# Patient Record
Sex: MALE | Race: WHITE | NOT HISPANIC OR LATINO | Employment: FULL TIME | URBAN - METROPOLITAN AREA
[De-identification: names, ages, dates, MRNs, and addresses within clinical notes are randomized per-mention and may not be internally consistent; named-entity substitution may affect disease eponyms.]

---

## 2023-08-22 PROBLEM — E11.9 TYPE 2 DIABETES MELLITUS (MULTI): Status: ACTIVE | Noted: 2023-08-22

## 2023-08-22 PROBLEM — Z96.0 S/P URETERAL STENT PLACEMENT: Status: ACTIVE | Noted: 2023-08-22

## 2023-08-22 PROBLEM — I10 ESSENTIAL (PRIMARY) HYPERTENSION: Status: ACTIVE | Noted: 2023-08-22

## 2023-08-22 PROBLEM — G47.33 OSA (OBSTRUCTIVE SLEEP APNEA): Status: ACTIVE | Noted: 2023-08-22

## 2023-08-22 PROBLEM — K21.9 GERD (GASTROESOPHAGEAL REFLUX DISEASE): Status: ACTIVE | Noted: 2023-08-22

## 2023-08-22 PROBLEM — E78.5 HLD (HYPERLIPIDEMIA): Status: ACTIVE | Noted: 2023-08-22

## 2023-08-22 PROBLEM — R53.83 FATIGUE: Status: ACTIVE | Noted: 2023-08-22

## 2023-08-22 PROBLEM — E29.1 HYPOGONADISM MALE: Status: ACTIVE | Noted: 2023-08-22

## 2023-08-22 RX ORDER — PRAVASTATIN SODIUM 40 MG/1
40 TABLET ORAL DAILY
COMMUNITY
Start: 2020-09-23 | End: 2024-03-18 | Stop reason: SDUPTHER

## 2023-08-22 RX ORDER — METFORMIN HYDROCHLORIDE 500 MG/1
1000 TABLET, EXTENDED RELEASE ORAL 2 TIMES DAILY
COMMUNITY
Start: 2022-06-29 | End: 2024-01-03 | Stop reason: ALTCHOICE

## 2023-08-22 RX ORDER — TIRZEPATIDE 10 MG/.5ML
INJECTION, SOLUTION SUBCUTANEOUS
COMMUNITY
Start: 2023-05-17 | End: 2023-10-12

## 2023-08-22 RX ORDER — CETIRIZINE HYDROCHLORIDE 10 MG/1
1 TABLET, ORALLY DISINTEGRATING ORAL DAILY
COMMUNITY

## 2023-08-22 RX ORDER — LANSOPRAZOLE 30 MG/1
30 CAPSULE, DELAYED RELEASE ORAL DAILY
COMMUNITY
Start: 2017-08-10 | End: 2023-08-30 | Stop reason: SDUPTHER

## 2023-08-22 RX ORDER — TAMSULOSIN HYDROCHLORIDE 0.4 MG/1
0.4 CAPSULE ORAL DAILY
COMMUNITY
Start: 2022-07-12 | End: 2023-10-12

## 2023-08-22 RX ORDER — CLOTRIMAZOLE AND BETAMETHASONE DIPROPIONATE 10; .5 MG/ML; MG/ML
LOTION TOPICAL
COMMUNITY
Start: 2016-11-10 | End: 2023-10-12

## 2023-08-23 NOTE — PROGRESS NOTES
"Subjective   Chief Complaint   Patient presents with    Follow-up     Enrique is here today for routine 6 month F/U.   Pt has c/o right hand , middle finger as patient has had problems for a few months now.        Patient ID: Enrique Chun is a 52 y.o. male who presents for Follow-up (Enrique is here today for routine 6 month F/U. /Pt has c/o right hand , middle finger as patient has had problems for a few months now. ).    HPI  53 yo male presents for 6 mo f/u  PMH: T2DM, HYPOGONADISM, HLD, GERD, LAXMI     Pt has EGD every 3 yrs for GERD  states he has been constipated, taking MiraLax only once per day  sx started when he began using Mounjaro   denies abdominal bloating     Pt c/o RIGHT middle finger pain mostly with flexing  Feels like something is \"pulling inside the finger\"  Denies injury or trauma  States it does not \"lock up or get stuck\"      #LAXMI  Sleep study July 2022  has not pursued any treatment      #T2DM  seeing Dr. SINA Mesa; LOV Feb 2023  A1C= 6% Dec 2022  Taking Metformin 2000 mg/day and once weekly Mounjaro   neuropathy: denies  BMI: 35  Last eye exam: 2022     #GERD  Taking Lansoprazole 30 mg daily  sx: controlled     #HLD  Taking Pravastatin 40 mg daily  FLP----> DUE   denies Myalgias      #HYPOGONADISM  Testosterone= 152 July 2022   Endo not concerned, no tx     #OBESITY, BMI   70# weight loss since Sept 2022     #HM  COLON: 2024 w/Dr. Gallegos   PSA: 2021  FBW: DUE       Review of Systems  All 13 systems were reviewed and are within normal limits except positive and pertinent negative responses which are noted below or in HPI.      Objective   /74   Pulse 56   Ht 1.829 m (6')   Wt 118 kg (260 lb)   BMI 35.26 kg/m²        Physical Exam  Vitals reviewed.   Cardiovascular:      Pulses: Normal pulses.   Pulmonary:      Effort: Pulmonary effort is normal.   Skin:     General: Skin is warm and dry.      Capillary Refill: Capillary refill takes less than 2 seconds.   Neurological:      " Mental Status: He is alert.   Psychiatric:         Mood and Affect: Mood normal.         Assessment/Plan   Problem List Items Addressed This Visit       Essential (primary) hypertension - Primary    GERD (gastroesophageal reflux disease)    HLD (hyperlipidemia)    LAXMI (obstructive sleep apnea)    Type 2 diabetes mellitus (CMS/[facility])    Relevant Orders    Comprehensive Metabolic Panel    Lipid Panel    Vitamin B12     Other Visit Diagnoses       Finger joint stiff, right        Relevant Orders    Referral to Orthopaedic Surgery

## 2023-08-24 ENCOUNTER — OFFICE VISIT (OUTPATIENT)
Dept: PRIMARY CARE | Facility: CLINIC | Age: 53
End: 2023-08-24
Payer: COMMERCIAL

## 2023-08-24 VITALS
SYSTOLIC BLOOD PRESSURE: 116 MMHG | HEIGHT: 72 IN | HEART RATE: 56 BPM | WEIGHT: 260 LBS | DIASTOLIC BLOOD PRESSURE: 74 MMHG | BODY MASS INDEX: 35.21 KG/M2

## 2023-08-24 DIAGNOSIS — G47.33 OSA (OBSTRUCTIVE SLEEP APNEA): ICD-10-CM

## 2023-08-24 DIAGNOSIS — E11.9 TYPE 2 DIABETES MELLITUS WITHOUT COMPLICATION, WITHOUT LONG-TERM CURRENT USE OF INSULIN (MULTI): ICD-10-CM

## 2023-08-24 DIAGNOSIS — M25.641 FINGER JOINT STIFF, RIGHT: ICD-10-CM

## 2023-08-24 DIAGNOSIS — E78.2 MIXED HYPERLIPIDEMIA: ICD-10-CM

## 2023-08-24 DIAGNOSIS — K21.9 GASTROESOPHAGEAL REFLUX DISEASE WITHOUT ESOPHAGITIS: ICD-10-CM

## 2023-08-24 DIAGNOSIS — I10 ESSENTIAL (PRIMARY) HYPERTENSION: Primary | ICD-10-CM

## 2023-08-24 PROBLEM — R53.83 FATIGUE: Status: RESOLVED | Noted: 2023-08-22 | Resolved: 2023-08-24

## 2023-08-24 LAB — HEMOGLOBIN A1C/HEMOGLOBIN TOTAL IN BLOOD EXTERNAL: 6 %

## 2023-08-24 PROCEDURE — 99213 OFFICE O/P EST LOW 20 MIN: CPT | Performed by: NURSE PRACTITIONER

## 2023-08-24 PROCEDURE — 3074F SYST BP LT 130 MM HG: CPT | Performed by: NURSE PRACTITIONER

## 2023-08-24 PROCEDURE — 3044F HG A1C LEVEL LT 7.0%: CPT | Performed by: NURSE PRACTITIONER

## 2023-08-24 PROCEDURE — 1036F TOBACCO NON-USER: CPT | Performed by: NURSE PRACTITIONER

## 2023-08-24 PROCEDURE — 3078F DIAST BP <80 MM HG: CPT | Performed by: NURSE PRACTITIONER

## 2023-08-24 NOTE — PATIENT INSTRUCTIONS
Thank you for seeing me today. It was a pleasure to see you again!    #FINGER STIFFNESS, RIGHT  See ortho     #SEVERE LAXMI   Consider seeing specialist for treatment      #T2DM  Tx per Dr. SINA Trotter   c/w Metformin 2000 mg/day and weekly Mounjaro   Rx Pravastatin   FLP due      #HLD  c/w Pravastatin  exercise regularly      #GERD  c/w Lansoprazole   avoid triggers     #BMI 35  GREAT JOB with losing weight   In a face to face session, I informed patient of his/her BMI > 30. We discussed appropriate nutrition choices and exercise plan to help achieve weight reduction.     Lab work ordered today.  Please have your blood drawn in the next 1-2 weeks.  You need to be FASTING for 12 hours prior to blood draw.  You may only have water.  Please contact your insurance company to ask about the best location to get blood drawn.  We will contact you with the results of your blood work and any necessary adjustments  to your plan of care, if you do not hear from us within 3-5 days of having your blood drawn, please call the office at [phone].       RTC 6 MONTHS AND AS NEEDED

## 2023-08-30 DIAGNOSIS — I10 ESSENTIAL (PRIMARY) HYPERTENSION: ICD-10-CM

## 2023-08-30 RX ORDER — LANSOPRAZOLE 30 MG/1
30 CAPSULE, DELAYED RELEASE ORAL DAILY
Qty: 90 CAPSULE | Refills: 3 | Status: SHIPPED | OUTPATIENT
Start: 2023-08-30 | End: 2024-08-24

## 2023-10-11 NOTE — PROGRESS NOTES
HPI:   53 yo with Diabetes 2 (dx in his 40's), Dyslipidemia, fatty liver (seen on june 2022 scan), alec presents for followup. Last A1c-6.%, today 5.7%. Pt is testing sugars <1 times per day. Pt is having low sugars 0 times/week. Pt does not recall recent sugar levels. Pt is doing better following a carb controlled diet and knows reasonable carb allowances. Pt is able to afford their medications. Pt is active at work.           Pt taking full dose metformin 500mgER 4 pills at dinner, mounjaro 12.5mg weekly (increased last visit), anjel is an option for fatty liver in the future.  -wt high as 330lbs, today 254lbs           Taking pravastatin 40mg for lipids.         Labs: june 2022: lft-neg, cr-1.39         -recent L sided kidney stone-pt seeing urology    -ordered full labs April 2023, pt never completed them, has labs pending with pcp             Current Outpatient Medications:     blood sugar diagnostic strip, Inject under the skin once daily. As directed, Disp: , Rfl:     cetirizine (Children's ZyrTEC Allergy) 10 mg tablet,disintegrating, Take 1 tablet by mouth once daily., Disp: , Rfl:     lansoprazole (Prevacid) 30 mg DR capsule, Take 1 capsule (30 mg) by mouth once daily. Before a meal, Disp: 90 capsule, Rfl: 3    metFORMIN XR (Glucophage-XR) 500 mg 24 hr tablet, Take 2 tablets (1,000 mg) by mouth 2 times a day., Disp: , Rfl:     pravastatin (Pravachol) 40 mg tablet, Take 1 tablet (40 mg) by mouth once daily., Disp: , Rfl:     tirzepatide 15 mg/0.5 mL pen injector, Inject 15 mg under the skin every 7 days., Disp: 6 mL, Rfl: 3    Review of Systems:  Cardiology: Lightheadedness-denies.  Chest pain-denies.  Leg edema-denies.  Palpitations-denies.  Respiratory: Cough-denies. Shortness of breath-denies.  Wheezing-denies.  Gastroenterology: Constipation-positive.  Diarrhea-denies.  Heartburn-denies.  Endocrinology: Cold intolerance-denies.  Heat intolerance-denies.  Sweats-denies.  Neurology: Headache-denies.   Tremor-denies.  Neuropathy in extremities-denies.  Psychology: Low energy-denies.  Irritability-denies.  Sleep disturbances-denies.        Labs:    /74   Pulse 53   Wt 115 kg (254 lb 9.6 oz)   BMI 34.53 kg/m²     Physical Exam:  General Appearance: pleasant, cooperative, no acute distress  HEENT: no chemosis, no proptosis, no lid lag, no lid retraction  Neck: no lymphadenopathy, no thyromegaly, no dominant thyroid nodules  Heart: no murmurs, regular rate and rhythm, S1 and S2  Lungs: no wheezes, no rhonci, no rales  Extremities: no lower extremity swelling    Assessment and Plan:  1. Type 2 diabetes mellitus with other specified complication, unspecified whether long term insulin use (CMS/[facility])  -increase to 15mg mounjaro, having great result  -increase fluid intake/stool softner/possible miralax if constipation gets worse  -stop metformin  -test sugars weekly    2. Fatty Liver Disease  -wt lost and mounjaro, low dose tzd is an option if needed    3. Mixed hyperlipidemia  -on statin, labs pending         Follow Up:  -6 months, [place]    -labs/tests/notes reviewed  -reviewed and counseled patient on medication monitoring and side effects

## 2023-10-12 ENCOUNTER — OFFICE VISIT (OUTPATIENT)
Dept: ENDOCRINOLOGY | Facility: CLINIC | Age: 53
End: 2023-10-12
Payer: COMMERCIAL

## 2023-10-12 VITALS
HEART RATE: 53 BPM | BODY MASS INDEX: 34.53 KG/M2 | WEIGHT: 254.6 LBS | SYSTOLIC BLOOD PRESSURE: 120 MMHG | DIASTOLIC BLOOD PRESSURE: 74 MMHG

## 2023-10-12 DIAGNOSIS — I10 ESSENTIAL (PRIMARY) HYPERTENSION: ICD-10-CM

## 2023-10-12 DIAGNOSIS — E11.69 TYPE 2 DIABETES MELLITUS WITH OTHER SPECIFIED COMPLICATION, UNSPECIFIED WHETHER LONG TERM INSULIN USE (MULTI): Primary | ICD-10-CM

## 2023-10-12 DIAGNOSIS — E78.2 MIXED HYPERLIPIDEMIA: ICD-10-CM

## 2023-10-12 LAB — POC HEMOGLOBIN A1C: 5.7 % (ref 4.2–6.5)

## 2023-10-12 PROCEDURE — 3044F HG A1C LEVEL LT 7.0%: CPT | Performed by: INTERNAL MEDICINE

## 2023-10-12 PROCEDURE — 99214 OFFICE O/P EST MOD 30 MIN: CPT | Performed by: INTERNAL MEDICINE

## 2023-10-12 PROCEDURE — 3078F DIAST BP <80 MM HG: CPT | Performed by: INTERNAL MEDICINE

## 2023-10-12 PROCEDURE — 3074F SYST BP LT 130 MM HG: CPT | Performed by: INTERNAL MEDICINE

## 2023-10-12 PROCEDURE — 1036F TOBACCO NON-USER: CPT | Performed by: INTERNAL MEDICINE

## 2023-10-12 PROCEDURE — 83036 HEMOGLOBIN GLYCOSYLATED A1C: CPT | Performed by: INTERNAL MEDICINE

## 2023-10-12 ASSESSMENT — ENCOUNTER SYMPTOMS
OCCASIONAL FEELINGS OF UNSTEADINESS: 0
DEPRESSION: 0
LOSS OF SENSATION IN FEET: 0

## 2023-10-12 ASSESSMENT — PATIENT HEALTH QUESTIONNAIRE - PHQ9
1. LITTLE INTEREST OR PLEASURE IN DOING THINGS: NOT AT ALL
2. FEELING DOWN, DEPRESSED OR HOPELESS: NOT AT ALL
SUM OF ALL RESPONSES TO PHQ9 QUESTIONS 1 & 2: 0

## 2023-10-12 ASSESSMENT — PAIN SCALES - GENERAL: PAINLEVEL: 0-NO PAIN

## 2023-10-17 ENCOUNTER — LAB (OUTPATIENT)
Dept: LAB | Facility: LAB | Age: 53
End: 2023-10-17
Payer: COMMERCIAL

## 2023-10-17 DIAGNOSIS — E11.9 TYPE 2 DIABETES MELLITUS WITHOUT COMPLICATION, WITHOUT LONG-TERM CURRENT USE OF INSULIN (MULTI): ICD-10-CM

## 2023-10-17 LAB
ALBUMIN SERPL BCP-MCNC: 4.2 G/DL (ref 3.4–5)
ALP SERPL-CCNC: 62 U/L (ref 33–120)
ALT SERPL W P-5'-P-CCNC: 15 U/L (ref 10–52)
ANION GAP SERPL CALC-SCNC: 10 MMOL/L (ref 10–20)
AST SERPL W P-5'-P-CCNC: 14 U/L (ref 9–39)
BILIRUB SERPL-MCNC: 0.4 MG/DL (ref 0–1.2)
BUN SERPL-MCNC: 13 MG/DL (ref 6–23)
CALCIUM SERPL-MCNC: 9.3 MG/DL (ref 8.6–10.3)
CHLORIDE SERPL-SCNC: 104 MMOL/L (ref 98–107)
CHOLEST SERPL-MCNC: 111 MG/DL (ref 0–199)
CHOLESTEROL/HDL RATIO: 3
CO2 SERPL-SCNC: 29 MMOL/L (ref 21–32)
CREAT SERPL-MCNC: 0.89 MG/DL (ref 0.5–1.3)
GFR SERPL CREATININE-BSD FRML MDRD: >90 ML/MIN/1.73M*2
GLUCOSE SERPL-MCNC: 96 MG/DL (ref 74–99)
HDLC SERPL-MCNC: 37.1 MG/DL
LDLC SERPL CALC-MCNC: 57 MG/DL
NON HDL CHOLESTEROL: 74 MG/DL (ref 0–149)
POTASSIUM SERPL-SCNC: 4.4 MMOL/L (ref 3.5–5.3)
PROT SERPL-MCNC: 7 G/DL (ref 6.4–8.2)
SODIUM SERPL-SCNC: 139 MMOL/L (ref 136–145)
TRIGL SERPL-MCNC: 85 MG/DL (ref 0–149)
VLDL: 17 MG/DL (ref 0–40)

## 2023-10-17 PROCEDURE — 80061 LIPID PANEL: CPT

## 2023-10-17 PROCEDURE — 80053 COMPREHEN METABOLIC PANEL: CPT

## 2023-10-17 PROCEDURE — 82607 VITAMIN B-12: CPT

## 2023-10-17 PROCEDURE — 36415 COLL VENOUS BLD VENIPUNCTURE: CPT

## 2023-10-18 LAB — VIT B12 SERPL-MCNC: 270 PG/ML (ref 211–911)

## 2024-01-03 ENCOUNTER — OFFICE VISIT (OUTPATIENT)
Dept: PRIMARY CARE | Facility: CLINIC | Age: 54
End: 2024-01-03
Payer: COMMERCIAL

## 2024-01-03 VITALS
DIASTOLIC BLOOD PRESSURE: 70 MMHG | BODY MASS INDEX: 35.89 KG/M2 | WEIGHT: 265 LBS | SYSTOLIC BLOOD PRESSURE: 111 MMHG | HEIGHT: 72 IN | HEART RATE: 57 BPM

## 2024-01-03 DIAGNOSIS — E11.69 TYPE 2 DIABETES MELLITUS WITH OTHER SPECIFIED COMPLICATION, UNSPECIFIED WHETHER LONG TERM INSULIN USE (MULTI): ICD-10-CM

## 2024-01-03 DIAGNOSIS — S16.1XXA ACUTE STRAIN OF NECK MUSCLE, INITIAL ENCOUNTER: Primary | ICD-10-CM

## 2024-01-03 PROCEDURE — 99212 OFFICE O/P EST SF 10 MIN: CPT | Performed by: NURSE PRACTITIONER

## 2024-01-03 PROCEDURE — 1036F TOBACCO NON-USER: CPT | Performed by: NURSE PRACTITIONER

## 2024-01-03 PROCEDURE — 3074F SYST BP LT 130 MM HG: CPT | Performed by: NURSE PRACTITIONER

## 2024-01-03 PROCEDURE — 3078F DIAST BP <80 MM HG: CPT | Performed by: NURSE PRACTITIONER

## 2024-01-03 RX ORDER — NAPROXEN 500 MG/1
500 TABLET ORAL 2 TIMES DAILY PRN
Qty: 60 TABLET | Refills: 0 | Status: SHIPPED | OUTPATIENT
Start: 2024-01-03 | End: 2024-01-15 | Stop reason: WASHOUT

## 2024-01-03 RX ORDER — NAPROXEN 500 MG/1
500 TABLET ORAL 2 TIMES DAILY PRN
Qty: 60 TABLET | Refills: 0 | Status: SHIPPED | OUTPATIENT
Start: 2024-01-03 | End: 2024-01-03 | Stop reason: SDUPTHER

## 2024-01-03 NOTE — PATIENT INSTRUCTIONS
Thank you for seeing me today.  It was a pleasure to see you again!    #CERVICAL STRAIN  Rx Naproxen twice daily  Heat 2-3 times/day  Neck stretching     Call in 3 weeks if no improvement and can try steroid

## 2024-01-03 NOTE — PROGRESS NOTES
Enrique Chun is a 53 y.o. male who presents today for SDS visit      Chief Complaint   Patient presents with    Shoulder Pain     ENRIQUE IS HERE TODAY WITH C/O LEFT SHOULDER PAIN X4 DAYS . PT ALSO HAS C/O INCREASED BURPING AFTER TAKING HIS MOUNJARO 15MG .        Symptoms: LEFT SIDED NECK PAIN RADIATING INTO LEFT SHOULDER AND ARM, WHICH TINGLES OCCASIONALLY   Pt denies any fall/injury     Symptom onset has been acute for a time period of 3 day(s).     Severity is described as mild.     Course of her symptoms over time is acute.    Has taken: Ibuprofen      Review of Systems  All 13 systems were reviewed and are within normal limits except positive and pertinent negative responses which are noted below or in HPI.        Objective   Vitals:  /70   Pulse 57   Ht 1.829 m (6')   Wt 120 kg (265 lb)   BMI 35.94 kg/m²         Physical Exam  Vitals reviewed.   Neck:     Musculoskeletal:         General: Tenderness present. No swelling.   Neurological:      Mental Status: He is alert.         Assessment/Plan   Problem List Items Addressed This Visit             ICD-10-CM    Type 2 diabetes mellitus with other specified complication, unspecified whether long term insulin use (CMS/[facility]) E11.69    Relevant Medications    naproxen (Naprosyn) 500 mg tablet     Other Visit Diagnoses         Codes    Acute strain of neck muscle, initial encounter    -  Primary S16.1XXA

## 2024-01-05 ENCOUNTER — TELEPHONE (OUTPATIENT)
Dept: PRIMARY CARE | Facility: CLINIC | Age: 54
End: 2024-01-05
Payer: COMMERCIAL

## 2024-01-05 DIAGNOSIS — S16.1XXA ACUTE STRAIN OF NECK MUSCLE, INITIAL ENCOUNTER: Primary | ICD-10-CM

## 2024-01-05 RX ORDER — PREDNISONE 20 MG/1
40 TABLET ORAL DAILY
Qty: 10 TABLET | Refills: 0 | Status: SHIPPED | OUTPATIENT
Start: 2024-01-05 | End: 2024-01-10

## 2024-01-05 NOTE — TELEPHONE ENCOUNTER
"PT CALLED STATING THAT THE RX HE GOT ON 1/3/24 IS NOT WORKING AND WOULD LIKE SOMETHING ELSE.  PT REQUESTING STERIOD  TO [facility] [place]  Patient also stated that he had \"an old stock pile of tramadol\" that he used which really helped his pain and wanted that refilled?\"   "

## 2024-01-10 ENCOUNTER — TELEPHONE (OUTPATIENT)
Dept: PRIMARY CARE | Facility: CLINIC | Age: 54
End: 2024-01-10
Payer: COMMERCIAL

## 2024-01-10 DIAGNOSIS — S16.1XXA ACUTE STRAIN OF NECK MUSCLE, INITIAL ENCOUNTER: Primary | ICD-10-CM

## 2024-01-11 ENCOUNTER — ANCILLARY PROCEDURE (OUTPATIENT)
Dept: RADIOLOGY | Facility: CLINIC | Age: 54
End: 2024-01-11
Payer: COMMERCIAL

## 2024-01-11 DIAGNOSIS — S16.1XXA ACUTE STRAIN OF NECK MUSCLE, INITIAL ENCOUNTER: ICD-10-CM

## 2024-01-11 PROCEDURE — 72050 X-RAY EXAM NECK SPINE 4/5VWS: CPT

## 2024-01-11 PROCEDURE — 72050 X-RAY EXAM NECK SPINE 4/5VWS: CPT | Performed by: RADIOLOGY

## 2024-01-11 RX ORDER — CYCLOBENZAPRINE HCL 10 MG
10 TABLET ORAL NIGHTLY PRN
Qty: 30 TABLET | Refills: 0 | Status: SHIPPED | OUTPATIENT
Start: 2024-01-11 | End: 2024-01-11 | Stop reason: SDUPTHER

## 2024-01-11 RX ORDER — CYCLOBENZAPRINE HCL 10 MG
10 TABLET ORAL NIGHTLY PRN
Qty: 30 TABLET | Refills: 0 | Status: SHIPPED | OUTPATIENT
Start: 2024-01-11 | End: 2024-03-18 | Stop reason: WASHOUT

## 2024-01-12 ENCOUNTER — TELEPHONE (OUTPATIENT)
Dept: PRIMARY CARE | Facility: CLINIC | Age: 54
End: 2024-01-12
Payer: COMMERCIAL

## 2024-01-12 NOTE — TELEPHONE ENCOUNTER
Rx to [facility] was discontinued however mail order pharmacy already dispensed medication in the  mail so pt will need to wait to get medication  from mail order now that it is in route.

## 2024-01-12 NOTE — TELEPHONE ENCOUNTER
Enrique calling because [facility] says they will not fill his prescription for Flexeril until February. He would like to know if you could contact [employer] to let them know you canceled the prescription with [facility] so they will fill for him today

## 2024-01-15 ENCOUNTER — OFFICE VISIT (OUTPATIENT)
Dept: PRIMARY CARE | Facility: CLINIC | Age: 54
End: 2024-01-15
Payer: COMMERCIAL

## 2024-01-15 VITALS
SYSTOLIC BLOOD PRESSURE: 126 MMHG | RESPIRATION RATE: 16 BRPM | DIASTOLIC BLOOD PRESSURE: 64 MMHG | HEIGHT: 72 IN | HEART RATE: 77 BPM | BODY MASS INDEX: 35.54 KG/M2 | OXYGEN SATURATION: 96 % | WEIGHT: 262.4 LBS

## 2024-01-15 DIAGNOSIS — S16.1XXA ACUTE STRAIN OF NECK MUSCLE, INITIAL ENCOUNTER: Primary | ICD-10-CM

## 2024-01-15 PROCEDURE — 1036F TOBACCO NON-USER: CPT | Performed by: STUDENT IN AN ORGANIZED HEALTH CARE EDUCATION/TRAINING PROGRAM

## 2024-01-15 PROCEDURE — 99213 OFFICE O/P EST LOW 20 MIN: CPT | Performed by: STUDENT IN AN ORGANIZED HEALTH CARE EDUCATION/TRAINING PROGRAM

## 2024-01-15 PROCEDURE — 3074F SYST BP LT 130 MM HG: CPT | Performed by: STUDENT IN AN ORGANIZED HEALTH CARE EDUCATION/TRAINING PROGRAM

## 2024-01-15 PROCEDURE — 3078F DIAST BP <80 MM HG: CPT | Performed by: STUDENT IN AN ORGANIZED HEALTH CARE EDUCATION/TRAINING PROGRAM

## 2024-01-15 RX ORDER — GABAPENTIN 300 MG/1
300 CAPSULE ORAL 3 TIMES DAILY
Qty: 90 CAPSULE | Refills: 5 | Status: SHIPPED | OUTPATIENT
Start: 2024-01-15 | End: 2024-04-12 | Stop reason: WASHOUT

## 2024-01-15 RX ORDER — LANOLIN ALCOHOL/MO/W.PET/CERES
1000 CREAM (GRAM) TOPICAL DAILY
COMMUNITY

## 2024-01-15 ASSESSMENT — PATIENT HEALTH QUESTIONNAIRE - PHQ9
SUM OF ALL RESPONSES TO PHQ9 QUESTIONS 1 AND 2: 0
2. FEELING DOWN, DEPRESSED OR HOPELESS: NOT AT ALL
1. LITTLE INTEREST OR PLEASURE IN DOING THINGS: NOT AT ALL

## 2024-01-15 ASSESSMENT — ENCOUNTER SYMPTOMS
NUMBNESS: 1
NECK PAIN: 1

## 2024-01-15 NOTE — PROGRESS NOTES
Subjective   Patient ID: Enrique Chun is a 53 y.o. male who presents for Arm Pain (Pt is here for a possible pinched nerve in his left side for his arm and neck for the last 2 weeks. Pt saw [place] for this on 1/6/23.).    Neck Pain   Pt was seen on 1/3/24 for neck sprain.   He was given steroid pack and flexeril without pain relief.   Xray of the cervical spine- showed mild arthritis     Left side with radiation through the shoulder and hand  Numbness in pointer and middle finger   Triceps spasms through the elbow   No injury (woke up in the AM with symptoms)  Full ROM   Improvement with movement, worse with sitting   Taking nightquil to help him sleep       Neck Pain   This is a new problem. The current episode started 1 to 4 weeks ago. The problem occurs intermittently. The problem has been waxing and waning. The pain is present in the left side. The quality of the pain is described as shooting. The pain is at a severity of 8/10 (constant at a 6 with flares up to an 8). Worse during: worse without movement. Associated symptoms include numbness.       Review of Systems   Musculoskeletal:  Positive for neck pain.   Neurological:  Positive for numbness.       Objective   Physical Exam  Vitals reviewed.   Constitutional:       Appearance: Normal appearance.   Cardiovascular:      Rate and Rhythm: Normal rate and regular rhythm.      Heart sounds: No murmur heard.  Pulmonary:      Effort: Pulmonary effort is normal. No respiratory distress.      Breath sounds: Normal breath sounds. No wheezing.   Musculoskeletal:      Cervical back: Neck supple.      Left lower leg: No edema.   Neurological:      Mental Status: He is alert.       Assessment/Plan   Neck Strain   Trailed: medrol dose pack, naproxen   Try gabapentin 300 mg TID   Physical therapy ordered     RTC for routine care         Jazmin Conrad DO 01/15/24 1:24 PM

## 2024-01-23 ENCOUNTER — EVALUATION (OUTPATIENT)
Dept: PHYSICAL THERAPY | Facility: CLINIC | Age: 54
End: 2024-01-23
Payer: COMMERCIAL

## 2024-01-23 DIAGNOSIS — M79.602 LEFT ARM PAIN: ICD-10-CM

## 2024-01-23 DIAGNOSIS — S16.1XXA ACUTE STRAIN OF NECK MUSCLE, INITIAL ENCOUNTER: ICD-10-CM

## 2024-01-23 DIAGNOSIS — M54.12 CERVICAL RADICULOPATHY: Primary | ICD-10-CM

## 2024-01-23 PROCEDURE — 97530 THERAPEUTIC ACTIVITIES: CPT | Mod: GP

## 2024-01-23 PROCEDURE — 97012 MECHANICAL TRACTION THERAPY: CPT | Mod: GP

## 2024-01-23 PROCEDURE — 97161 PT EVAL LOW COMPLEX 20 MIN: CPT | Mod: GP

## 2024-01-23 ASSESSMENT — PAIN SCALES - GENERAL: PAINLEVEL_OUTOF10: 6

## 2024-01-23 ASSESSMENT — PAIN - FUNCTIONAL ASSESSMENT: PAIN_FUNCTIONAL_ASSESSMENT: 0-10

## 2024-01-23 NOTE — PROGRESS NOTES
Physical Therapy Evaluation and Treatment     Patient Name: Enrique Chun  MRN: [id number]  PT Received On: 24  Time Calculation  Start Time: 0745  Stop Time: 0830  Time Calculation (min): 45 min  PT Evaluation Time Entry  PT Evaluation (Low) Time Entry: 20  PT Modalities Time Entry  Mechanical Traction Time Entry: 10  PT Therapeutic Procedures Time Entry  Therapeutic Activity Time Entry: 10    Current Problem:   1. Cervical radiculopathy  Follow Up In Physical Therapy      2. Acute strain of neck muscle, initial encounter  Referral to Physical Therapy    Follow Up In Physical Therapy      3. Left arm pain  Follow Up In Physical Therapy        Precautions:  Precautions  Precautions Comment: none    Subjective Evaluation    History of Present Illness  Mechanism of injury: Approx 23 woke with L arm pain, altered sensation. Observes some increased difficulty picking up a small object, e.g. paper clip, does not observe loss of power. Past hand injury with numbness in finger tips L middle and ring finger.    No benefit observed with steroid pack or mm relaxor, now on gabapentin 1 wk without observed help. Has noticed benefit with Nyquil and propping in bed differently, usually prefers sidesleep and arm under pillow.    Pain  Current pain ratin  At worst pain ratin  Location: shooting pain at times, constant pain L UT/shoulder blade, goes into L UE, triceps, elbow; forearm numbness into L index finger, middle finger (initially only index finger)  Relieving factors: change in position and medications (Nyquil helpful)  Aggravating factors: movement (head movement worsens pain, judah L sidebend)    Hand dominance: right    Diagnostic Tests  X-ray: abnormal (24 FINDINGS:  No acute compression fracture is seen. No subluxation is noted. Facet  joints normal alignment. mild degen endplate spurring, mild uncovertebral spurring, most conspicuous lower C-spine. mild facet arthrosis w/ mild  hypertrophy)    Treatments  Current treatment: medication  Patient Goals  Patient goals for therapy: decreased pain        Objective     Postural Observations    Additional Postural Observation Details  Mild FW head and shoulder girdles, increased Tspine flexion    Neurological Testing     Sensation   Cervical/Thoracic   Left   Diminished: light touch    Right   Intact: light touch    Comments   Left light touch: monofiliament testin.83 normal at L 5th tip, thumb tip perceived as palm; 3.61 felt all indicating diminished light touch.   Right light touch: palmar surface of hand and all finger tips.     Palpation   Left   Hypertonic in the upper trapezius.     Tenderness     Additional Tenderness Details  TTP L AIN, triceps, superficial radial n, pronator teres    Active Range of Motion   Cervical/Thoracic Spine   Cervical    Left lateral flexion: 22 degrees   Right lateral flexion: 28 degrees   Left rotation: 50 (has pressure at achieved ROM) degrees   Right rotation: 70 degrees     Additional Active Range of Motion Details  Thoracic extension and rotation demonstrate stiffness    Strength/Myotome Testing     Left Wrist/Hand      (2nd hand position)     Trial 1: 85    Trial 2: 80    Trial 3: 81    Average: 82    Thumb Strength  Tip/Two-Point Pinch     Trial 1: 10    Trial 2: 10    Trial 3: 10    Average: 10  Palmar/Three-Point Pinch     Trial 1: 17    Trial 2: 16    Trial 3: 12    Average: 15     Right Wrist/Hand      (2nd hand position)     Trial 1: 91    Trial 2: 88    Trial 3: 84    Average: 87.67    Thumb Strength   Tip/Two-Point Pinch     Trial 1: 14    Trial 2: 10    Trial 3: 9    Average: 11  Palmar/Three-Point Pinch     Trial 1: 19    Trial 2: 22    Trial 3: 17    Average: 19.33    Additional Strength Details  Not at age-related norms R/unaffected hand, however L vs R pinch strength shows 22% deficit, norms generally 10% less non-dominant hand   strength within normal L/R variation at  6%    Tests   Cervical     Left   Positive active compression ([place]), cervical distraction and Spurling's sign.      Other Measures  Other Outcome Measures: L pinch strength 22% less than R    Treatments:   Ther-Act:  Instructed UE unloading and posture to avoid excessive cervical stresses    Modalities:  Mechanical traction: Static: 25 lbs, for 10 minutes with 3 x 30sec release; in Supine, with L UE propped    Other:  Dry needling in R sidelying following informed consent: B CPS, L spinal accessory n, L dorsal scapular n, L axillary n emergent point, L triceps, L deep radial n, L superficial radial n, L PIN, L AIN, L pronation teres    Assessment & Plan     Assessment  Impairments: abnormal muscle tone, abnormal or restricted ROM, impaired physical strength, lacks appropriate home exercise program and pain with function  Assessment details: Pt presents with acute L UE pain and altered sensation. Pt with remote injury to L middle and ring fingers which yielded decreased sensation as well. Pt presents with signs of L cervical radiculopathy involving C5-C6 and C6-C7. Expected to benefit from PT POC including cervical traction.    Low complexity due to patient's clinical presentation being stable and uncomplicated by any significant comorbidities that may affect rehab tolerance and progression.    Prognosis: good    Plan  Therapy options: will be seen for skilled physical therapy services  Planned modality interventions: electrical stimulation/Russian stimulation, thermotherapy (hydrocollator packs), traction and ultrasound  Other planned modality interventions: DN with ENS  Planned therapy interventions: postural training, manual therapy, strengthening, stretching, functional ROM exercises, flexibility, home exercise program and joint mobilization  Frequency: 2x week  Duration in visits: 10  Treatment plan discussed with: patient  Plan details: NO AUTH / [place] VISITS / DED $200 - $0 MET. 90% COVERAGE / OOP $300         Post-Treatment Pain:  Response to Interventions: some decreased numbness during traction    Goals:   Active       PT Problem       PT Goals 1-5       Start:  01/23/24    Expected End:  04/25/24       1) Indep with HEP and progression.  2) L pinch strength within 10% of R  3)  small objects without difficulty  4) Sleep ad wale without disruption from pain  5) Symmetrical cervical ROM without eliciting symptoms.         Patient Stated Goal: reduce pain       Start:  01/23/24    Expected End:  04/25/24

## 2024-01-25 PROBLEM — M54.12 CERVICAL RADICULOPATHY: Status: ACTIVE | Noted: 2024-01-25

## 2024-01-25 PROBLEM — S16.1XXA ACUTE STRAIN OF NECK MUSCLE: Status: ACTIVE | Noted: 2024-01-25

## 2024-01-25 PROBLEM — M79.602 LEFT ARM PAIN: Status: ACTIVE | Noted: 2024-01-25

## 2024-01-25 ASSESSMENT — ENCOUNTER SYMPTOMS
PAIN SCALE: 6
ALLEVIATING FACTORS: MEDICATIONS
ALLEVIATING FACTORS: CHANGE IN POSITION
EXACERBATED BY: MOVEMENT
PAIN SCALE AT HIGHEST: 8

## 2024-01-31 ENCOUNTER — TREATMENT (OUTPATIENT)
Dept: PHYSICAL THERAPY | Facility: CLINIC | Age: 54
End: 2024-01-31
Payer: COMMERCIAL

## 2024-01-31 DIAGNOSIS — S16.1XXA ACUTE STRAIN OF NECK MUSCLE, INITIAL ENCOUNTER: ICD-10-CM

## 2024-01-31 DIAGNOSIS — M79.602 LEFT ARM PAIN: ICD-10-CM

## 2024-01-31 DIAGNOSIS — M54.12 CERVICAL RADICULOPATHY: ICD-10-CM

## 2024-01-31 PROCEDURE — 97110 THERAPEUTIC EXERCISES: CPT | Mod: GP,CQ

## 2024-01-31 PROCEDURE — 97012 MECHANICAL TRACTION THERAPY: CPT | Mod: GP,CQ

## 2024-01-31 ASSESSMENT — PAIN SCALES - GENERAL: PAINLEVEL_OUTOF10: 5 - MODERATE PAIN

## 2024-01-31 ASSESSMENT — PAIN - FUNCTIONAL ASSESSMENT: PAIN_FUNCTIONAL_ASSESSMENT: 0-10

## 2024-01-31 NOTE — PROGRESS NOTES
Physical Therapy Treatment    Patient Name: Enrique Chun  MRN: [id number]  PT Received On: 01/31/24  Time Calculation  Start Time: 1546  Stop Time: 1633  Time Calculation (min): 47 min  PT Modalities Time Entry  Mechanical Traction Time Entry: 10  PT Therapeutic Procedures Time Entry  Therapeutic Exercise Time Entry: 30  Visit Number: 2  Visits/Dates Authorized: 10    Current Problem:   1. Acute strain of neck muscle, initial encounter  Follow Up In Physical Therapy      2. Cervical radiculopathy  Follow Up In Physical Therapy      3. Left arm pain  Follow Up In Physical Therapy        Surgery:   Surgery date:     Precautions:        Subjective   General:    Patient states ain, tingling, and numbness are always present in L UE but has good and bad days for pain, no particular position causes pain that he knows of. Patient reports he didn't notice too much after traction last session, but is open to trying it again.     Pre-Treatment Symptoms:   Pain Assessment: 0-10  Pain Score: 5 - Moderate pain    Objective   Findings:     Treatments:      Therapeutic Exercise:   Pulleys 2 min  UBE L2 reverse 4 min  TB row blue  2x15  TB horizontal abduction orange 2x10  TB B ER orange 2x10  Standing open book x10 R/L  Wall angels x10     DNP:  Hand- Putty pinch and pull   Hand- webbing extension     Neuromuscular Re-Ed:     Therapeutic Activity:    Gait Training:     Manual Therapy:       Modalities:  Mechanical traction: Static: 25 lbs, for 10 minutes with 3 steps down; in Supine with wedge, with L UE propped    Assessment:    Patient tolerated some new exercises today without complaint of increased pain in neck or L UE. Patient issued additional HEP at end of session, focusing on exercises performed today for shoulder strengthening and spinal mobility. Patient had mechanical traction performed again today, no change in radicular symptoms in L UE still pain/numbness/tingling present.     Post-Treatment Symptoms:   Response to  Interventions: looser in neck, same pain/numbness/tingling    Plan:    Continue with postural strengthening and mobility improvements in cervical and thoracic.     Goals:   Active       PT Problem       PT Goals 1-5       Start:  01/23/24    Expected End:  04/25/24       1) Indep with HEP and progression.  2) L pinch strength within 10% of R  3)  small objects without difficulty  4) Sleep ad wale without disruption from pain  5) Symmetrical cervical ROM without eliciting symptoms.         Patient Stated Goal: reduce pain       Start:  01/23/24    Expected End:  04/25/24

## 2024-02-02 ENCOUNTER — APPOINTMENT (OUTPATIENT)
Dept: PHYSICAL THERAPY | Facility: CLINIC | Age: 54
End: 2024-02-02
Payer: COMMERCIAL

## 2024-02-06 ENCOUNTER — APPOINTMENT (OUTPATIENT)
Dept: PHYSICAL THERAPY | Facility: CLINIC | Age: 54
End: 2024-02-06
Payer: COMMERCIAL

## 2024-02-06 ENCOUNTER — DOCUMENTATION (OUTPATIENT)
Dept: PHYSICAL THERAPY | Facility: CLINIC | Age: 54
End: 2024-02-06
Payer: COMMERCIAL

## 2024-02-06 NOTE — PROGRESS NOTES
Physical Therapy                 Therapy Communication Note    Patient Name: Enrique Chun  MRN: [id number]  Today's Date: 2/6/2024     Discipline: Physical Therapy    Missed Visit Reason:  cx due to time conflict    Missed Time: Cancel    Comment:

## 2024-02-09 ENCOUNTER — APPOINTMENT (OUTPATIENT)
Dept: PHYSICAL THERAPY | Facility: CLINIC | Age: 54
End: 2024-02-09
Payer: COMMERCIAL

## 2024-02-13 ENCOUNTER — TREATMENT (OUTPATIENT)
Dept: PHYSICAL THERAPY | Facility: CLINIC | Age: 54
End: 2024-02-13
Payer: COMMERCIAL

## 2024-02-13 DIAGNOSIS — M54.12 CERVICAL RADICULOPATHY: ICD-10-CM

## 2024-02-13 DIAGNOSIS — M79.602 LEFT ARM PAIN: ICD-10-CM

## 2024-02-13 DIAGNOSIS — S16.1XXA ACUTE STRAIN OF NECK MUSCLE, INITIAL ENCOUNTER: ICD-10-CM

## 2024-02-13 PROCEDURE — 97110 THERAPEUTIC EXERCISES: CPT | Mod: GP

## 2024-02-13 PROCEDURE — 97012 MECHANICAL TRACTION THERAPY: CPT | Mod: GP

## 2024-02-13 PROCEDURE — 97014 ELECTRIC STIMULATION THERAPY: CPT | Mod: GP

## 2024-02-13 ASSESSMENT — PAIN - FUNCTIONAL ASSESSMENT: PAIN_FUNCTIONAL_ASSESSMENT: 0-10

## 2024-02-13 ASSESSMENT — PAIN SCALES - GENERAL: PAINLEVEL_OUTOF10: 5 - MODERATE PAIN
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
## 2024-02-16 ENCOUNTER — TREATMENT (OUTPATIENT)
Dept: PHYSICAL THERAPY | Facility: CLINIC | Age: 54
End: 2024-02-16
Payer: COMMERCIAL

## 2024-02-16 DIAGNOSIS — M79.602 LEFT ARM PAIN: ICD-10-CM

## 2024-02-16 DIAGNOSIS — M54.12 CERVICAL RADICULOPATHY: ICD-10-CM

## 2024-02-16 DIAGNOSIS — S16.1XXA ACUTE STRAIN OF NECK MUSCLE, INITIAL ENCOUNTER: ICD-10-CM

## 2024-02-16 PROCEDURE — 97110 THERAPEUTIC EXERCISES: CPT | Mod: GP

## 2024-02-16 PROCEDURE — 97012 MECHANICAL TRACTION THERAPY: CPT | Mod: GP

## 2024-02-16 PROCEDURE — 97014 ELECTRIC STIMULATION THERAPY: CPT | Mod: GP

## 2024-02-16 ASSESSMENT — PAIN - FUNCTIONAL ASSESSMENT: PAIN_FUNCTIONAL_ASSESSMENT: 0-10

## 2024-02-16 ASSESSMENT — PAIN SCALES - GENERAL: PAINLEVEL_OUTOF10: 4

## 2024-02-16 NOTE — PROGRESS NOTES
Physical Therapy Treatment    Patient Name: Enrique Chun  MRN: [id number]  PT Received On: 02/16/24  Time Calculation  Start Time: 0830  Stop Time: 0920  Time Calculation (min): 50 min  PT Modalities Time Entry  E-Stim (Unattended) Time Entry: 10  Mechanical Traction Time Entry: 12  PT Therapeutic Procedures Time Entry  Therapeutic Exercise Time Entry: 8  Visit Number: 4 of 10  Visits/Dates Authorized: NO AUTH / [place] VISITS     Current Problem:   1. Acute strain of neck muscle, initial encounter  Follow Up In Physical Therapy      2. Cervical radiculopathy  Follow Up In Physical Therapy      3. Left arm pain  Follow Up In Physical Therapy        Precautions:     none    Subjective   General:   General Comment: Symptoms decreased but not absent.    Pre-Treatment Symptoms:   Pain Assessment: 0-10  Pain Score: 4 (some pain L medial scap, N&T L fingers)    Objective   Findings:   Bias for FW/depressed shoulder girdles  Pain response L deep radial n emergent point, AIN, prontator    Treatments:   Therapeutic Exercise:   Thera-bar green: bend, twist, squeeze, etc for L hand strength  Reinforced HEP incl Tband    Deferred:  Pulleys 2 min  UBE L2 reverse 4 min  TB row blue 2x15  TB horizontal abduction orange 2x10  TB B ER orange 2x10  Standing open book x10 R/L  Wall angels x10   Hand- Putty pinch and pull   Hand- webbing extension     Modalities:  Mechanical traction: Static: 31 lbs, for 12 minutes with 3 steps down; in Supine with wedge, with L UE propped    Dry needling following informed consent: with e-stim; 100Hz, mA to tolerance, applied to L CPS to dorsal scapular n, L CPS to L triceps, L CPS to deep radial n, for 10 minutes. Additional DN to L spinal accessory n, L axillary n emergent point, L deep radial n, L pronation teres, PIN, AIN (deferred superficial radial n)    Assessment:   PT Assessment  Assessment Comment: Responding favorably to POC.    Post-Treatment Symptoms:   Response to Interventions: Symptoms  decreased today, less apparent difference during CTx vs last session.    Plan:    Pt to trial home TENS. Continue with postural strengthening and mobility improvements in cervical and thoracic regions.     Goals:   Active       PT Problem       PT Goals 1-5       Start:  01/23/24    Expected End:  04/25/24       1) Indep with HEP and progression.  2) L pinch strength within 10% of R  3)  small objects without difficulty  4) Sleep ad wale without disruption from pain  5) Symmetrical cervical ROM without eliciting symptoms.         Patient Stated Goal: reduce pain       Start:  01/23/24    Expected End:  04/25/24

## 2024-02-20 ENCOUNTER — TREATMENT (OUTPATIENT)
Dept: PHYSICAL THERAPY | Facility: CLINIC | Age: 54
End: 2024-02-20
Payer: COMMERCIAL

## 2024-02-20 DIAGNOSIS — M54.12 CERVICAL RADICULOPATHY: ICD-10-CM

## 2024-02-20 DIAGNOSIS — S16.1XXA ACUTE STRAIN OF NECK MUSCLE, INITIAL ENCOUNTER: ICD-10-CM

## 2024-02-20 DIAGNOSIS — M79.602 LEFT ARM PAIN: ICD-10-CM

## 2024-02-20 PROCEDURE — 97014 ELECTRIC STIMULATION THERAPY: CPT | Mod: GP

## 2024-02-20 PROCEDURE — 97110 THERAPEUTIC EXERCISES: CPT | Mod: GP

## 2024-02-20 PROCEDURE — 97012 MECHANICAL TRACTION THERAPY: CPT | Mod: GP

## 2024-02-20 ASSESSMENT — PAIN - FUNCTIONAL ASSESSMENT: PAIN_FUNCTIONAL_ASSESSMENT: 0-10

## 2024-02-20 ASSESSMENT — PAIN SCALES - GENERAL: PAINLEVEL_OUTOF10: 3

## 2024-02-20 NOTE — PROGRESS NOTES
Physical Therapy Treatment    Patient Name: Enrique Chun  MRN: [id number]  PT Received On: 02/20/24  Time Calculation  Start Time: 1345  Stop Time: 1435  Time Calculation (min): 50 min  PT Modalities Time Entry  E-Stim (Unattended) Time Entry: 10  Mechanical Traction Time Entry: 12  PT Therapeutic Procedures Time Entry  Therapeutic Exercise Time Entry: 8  Visit Number: 5 of 10  Visits/Dates Authorized: NO AUTH / [place] VISITS     Current Problem:   1. Acute strain of neck muscle, initial encounter  Follow Up In Physical Therapy      2. Cervical radiculopathy  Follow Up In Physical Therapy      3. Left arm pain  Follow Up In Physical Therapy        Precautions:     none    Subjective   General:   General Comment: Neck, shoulder blade/arm better, still some decreased sensation L fingers but not at worst. Has not yet trialed home TENS unit. Has been active with household projects.    Pre-Treatment Symptoms:   Pain Assessment: 0-10  Pain Score: 3    Objective   Findings:   Bias for FW/depressed shoulder girdles  Pain response L deep radial n emergent point and L pronator    Treatments:   Therapeutic Exercise:   Thera-bar green: bend, twist, squeeze, etc for L hand strength  Wall slide/shrug  Wrist flexor stretches at counter and with manual overpressure    Deferred:  Pulleys 2 min  UBE L2 reverse 4 min  TB row blue 2x15  TB horizontal abduction orange 2x10  TB B ER orange 2x10  Standing open book x10 R/L  Wall angels x10   Hand- Putty pinch and pull   Hand- webbing extension     Modalities:  Mechanical traction: Static: 31 lbs, for 12 minutes with 3 steps down; in Supine with wedge, with L UE propped    Dry needling following informed consent: with e-stim; 100Hz, mA to tolerance, applied to L CPS to dorsal scapular n, L CPS to L triceps, L CPS to deep radial n, for 10 minutes. Additional DN to L spinal accessory n, L axillary n emergent point, L deep radial n, L pronation teres, PIN, AIN (deferred superficial radial  n)    Assessment:   PT Assessment  Assessment Comment: Favorable response to POC, decreased pain with palpation L forearm.    Post-Treatment Symptoms:   Response to Interventions: better    Plan:    Continue with postural strengthening and mobility improvements in cervical and thoracic regions.     Goals:   Active       PT Problem       PT Goals 1-5       Start:  01/23/24    Expected End:  04/25/24       1) Indep with HEP and progression.  2) L pinch strength within 10% of R  3)  small objects without difficulty  4) Sleep ad wale without disruption from pain  5) Symmetrical cervical ROM without eliciting symptoms.         Patient Stated Goal: reduce pain       Start:  01/23/24    Expected End:  04/25/24

## 2024-02-22 ENCOUNTER — TREATMENT (OUTPATIENT)
Dept: PHYSICAL THERAPY | Facility: CLINIC | Age: 54
End: 2024-02-22
Payer: COMMERCIAL

## 2024-02-22 DIAGNOSIS — S16.1XXA ACUTE STRAIN OF NECK MUSCLE, INITIAL ENCOUNTER: ICD-10-CM

## 2024-02-22 DIAGNOSIS — M54.12 CERVICAL RADICULOPATHY: ICD-10-CM

## 2024-02-22 DIAGNOSIS — M79.602 LEFT ARM PAIN: ICD-10-CM

## 2024-02-22 PROCEDURE — 97012 MECHANICAL TRACTION THERAPY: CPT | Mod: GP

## 2024-02-22 PROCEDURE — 97110 THERAPEUTIC EXERCISES: CPT | Mod: GP

## 2024-02-22 PROCEDURE — 97014 ELECTRIC STIMULATION THERAPY: CPT | Mod: GP

## 2024-02-22 ASSESSMENT — PAIN - FUNCTIONAL ASSESSMENT: PAIN_FUNCTIONAL_ASSESSMENT: 0-10

## 2024-02-22 ASSESSMENT — PAIN SCALES - GENERAL: PAINLEVEL_OUTOF10: 2

## 2024-02-22 NOTE — PROGRESS NOTES
Physical Therapy Treatment    Patient Name: Enrique Chun  MRN: [id number]  PT Received On: 02/22/24  Time Calculation  Start Time: 1300  Stop Time: 1350  Time Calculation (min): 50 min  PT Modalities Time Entry  E-Stim (Unattended) Time Entry: 10  Mechanical Traction Time Entry: 15  PT Therapeutic Procedures Time Entry  Therapeutic Exercise Time Entry: 8  Visit Number: 6 of 10  Visits/Dates Authorized: NO AUTH / [place] VISITS     Current Problem:   1. Acute strain of neck muscle, initial encounter  Follow Up In Physical Therapy      2. Cervical radiculopathy  Follow Up In Physical Therapy      3. Left arm pain  Follow Up In Physical Therapy        Precautions:     none    Subjective   General:   General Comment: Neck, shoulder blade/arm better, still some decreased sensation L fingers but not at worst. Able to carry dozens of buckets of sap without exacerbation. Compliant with added wrist flexor stretches.    Pre-Treatment Symptoms:   Pain Assessment: 0-10  Pain Score: 2    Objective   Findings:   Bias for FW/depressed shoulder girdles  Pain response L deep radial n emergent point and L pronator    Treatments:   Therapeutic Exercise:   Thera-bar green: bend, twist, squeeze, etc for L hand strength  Wall slide/shrug  Wrist flexor stretches at counter and with manual overpressure    Deferred:  Pulleys 2 min  UBE L2 reverse 4 min  TB row blue 2x15  TB horizontal abduction orange 2x10  TB B ER orange 2x10  Standing open book x10 R/L  Wall angels x10   Hand- Putty pinch and pull   Hand- webbing extension     Modalities:  Mechanical traction: Static: 31 lbs, for 15 minutes; in Supine with wedge, with L UE propped    Dry needling following informed consent: with e-stim; 100Hz, mA to tolerance, applied to L CPS to dorsal scapular n, L CPS to L triceps, L CPS to deep radial n, for 10 minutes. Additional DN to L spinal accessory n, L axillary n emergent point, L deep radial n, L pronation teres, PIN, AIN (deferred superficial  radial n)    Assessment:   PT Assessment  Assessment Comment: Favorable response to POC, decreased pain with palpation L forearm.    Post-Treatment Symptoms:   Response to Interventions: better    Plan:    Continue with postural strengthening and mobility improvements in cervical and thoracic regions.     Goals:   Active       PT Problem       PT Goals 1-5       Start:  01/23/24    Expected End:  04/25/24       1) Indep with HEP and progression.  2) L pinch strength within 10% of R  3)  small objects without difficulty  4) Sleep ad wale without disruption from pain  5) Symmetrical cervical ROM without eliciting symptoms.         Patient Stated Goal: reduce pain       Start:  01/23/24    Expected End:  04/25/24

## 2024-02-27 ENCOUNTER — APPOINTMENT (OUTPATIENT)
Dept: PHYSICAL THERAPY | Facility: CLINIC | Age: 54
End: 2024-02-27
Payer: COMMERCIAL

## 2024-02-27 ENCOUNTER — APPOINTMENT (OUTPATIENT)
Dept: PRIMARY CARE | Facility: CLINIC | Age: 54
End: 2024-02-27
Payer: COMMERCIAL

## 2024-03-01 ENCOUNTER — TREATMENT (OUTPATIENT)
Dept: PHYSICAL THERAPY | Facility: CLINIC | Age: 54
End: 2024-03-01
Payer: COMMERCIAL

## 2024-03-01 DIAGNOSIS — M79.602 LEFT ARM PAIN: ICD-10-CM

## 2024-03-01 DIAGNOSIS — S16.1XXA ACUTE STRAIN OF NECK MUSCLE, INITIAL ENCOUNTER: ICD-10-CM

## 2024-03-01 DIAGNOSIS — M54.12 CERVICAL RADICULOPATHY: ICD-10-CM

## 2024-03-01 PROCEDURE — 97014 ELECTRIC STIMULATION THERAPY: CPT | Mod: GP

## 2024-03-01 PROCEDURE — 97012 MECHANICAL TRACTION THERAPY: CPT | Mod: GP

## 2024-03-01 ASSESSMENT — PAIN SCALES - GENERAL: PAINLEVEL_OUTOF10: 2

## 2024-03-01 ASSESSMENT — PAIN - FUNCTIONAL ASSESSMENT: PAIN_FUNCTIONAL_ASSESSMENT: 0-10

## 2024-03-01 NOTE — PROGRESS NOTES
Physical Therapy Treatment    Patient Name: Enrique Chun  MRN: [id number]  PT Received On: 03/01/24  Time Calculation  Start Time: 0833  Stop Time: 0915  Time Calculation (min): 42 min  PT Modalities Time Entry  E-Stim (Unattended) Time Entry: 10  Mechanical Traction Time Entry: 15  PT Therapeutic Procedures Time Entry  Therapeutic Exercise Time Entry: 6  Visit Number: 7 of 10  Visits/Dates Authorized: NO AUTH / [place] VISITS     Current Problem:   1. Acute strain of neck muscle, initial encounter  Follow Up In Physical Therapy      2. Cervical radiculopathy  Follow Up In Physical Therapy      3. Left arm pain  Follow Up In Physical Therapy        Precautions:     none    Subjective   General:   General Comment: Continues to have symptom reduction, active with home projects/[employer].    Pre-Treatment Symptoms:   Pain Assessment: 0-10  Pain Score: 2    Objective   Findings:   Bias for FW/depressed shoulder girdles  Pain response L pronator and periscapular mm but not AIN/PIN/deep radial n today    Treatments:   Therapeutic Exercise:   Thera-bar green: bend, twist, squeeze, etc for L hand strength  Wall slide/shrug  Wrist flexor stretches at counter and with manual overpressure    Deferred:  Pulleys 2 min  UBE L2 reverse 4 min  TB row blue 2x15  TB horizontal abduction orange 2x10  TB B ER orange 2x10  Standing open book x10 R/L  Wall angels x10   Hand- Putty pinch and pull   Hand- webbing extension     Modalities:  Mechanical traction: Static: 31 lbs, for 15 minutes; in Supine with wedge, with L UE propped    Dry needling following informed consent: with e-stim; 100Hz, mA to tolerance, applied to L CPS to dorsal scapular n, L CPS to L triceps, L CPS to deep radial n, for 10 minutes. Additional DN to L spinal accessory n, L axillary n emergent point, L deep radial n, L pronation teres, PIN, AIN (deferred superficial radial n)    Assessment:   PT Assessment  Assessment Comment: Favorable response to POC, decreased  pain with palpation L forearm except pronator teres today. Somewhat more hypersensitive L periscapular though. Ready to trial increased duration between appts, will f/u 1 week.    Post-Treatment Symptoms:   Response to Interventions: better    Plan:    Continue with postural strengthening and mobility improvements in cervical and thoracic regions.     Goals:   Active       PT Problem       PT Goals 1-5       Start:  01/23/24    Expected End:  04/25/24       1) Indep with HEP and progression.  2) L pinch strength within 10% of R  3)  small objects without difficulty  4) Sleep ad wale without disruption from pain  5) Symmetrical cervical ROM without eliciting symptoms.         Patient Stated Goal: reduce pain       Start:  01/23/24    Expected End:  04/25/24

## 2024-03-06 ENCOUNTER — APPOINTMENT (OUTPATIENT)
Dept: PHYSICAL THERAPY | Facility: CLINIC | Age: 54
End: 2024-03-06
Payer: COMMERCIAL

## 2024-03-07 ENCOUNTER — TELEPHONE (OUTPATIENT)
Dept: PRIMARY CARE | Facility: CLINIC | Age: 54
End: 2024-03-07
Payer: COMMERCIAL

## 2024-03-08 ENCOUNTER — APPOINTMENT (OUTPATIENT)
Dept: PHYSICAL THERAPY | Facility: CLINIC | Age: 54
End: 2024-03-08
Payer: COMMERCIAL

## 2024-03-13 ENCOUNTER — TREATMENT (OUTPATIENT)
Dept: PHYSICAL THERAPY | Facility: CLINIC | Age: 54
End: 2024-03-13
Payer: COMMERCIAL

## 2024-03-13 DIAGNOSIS — S16.1XXA ACUTE STRAIN OF NECK MUSCLE, INITIAL ENCOUNTER: ICD-10-CM

## 2024-03-13 DIAGNOSIS — M54.12 CERVICAL RADICULOPATHY: ICD-10-CM

## 2024-03-13 DIAGNOSIS — M79.602 LEFT ARM PAIN: ICD-10-CM

## 2024-03-13 PROCEDURE — 97012 MECHANICAL TRACTION THERAPY: CPT | Mod: GP

## 2024-03-13 PROCEDURE — 97014 ELECTRIC STIMULATION THERAPY: CPT | Mod: GP

## 2024-03-13 PROCEDURE — 97110 THERAPEUTIC EXERCISES: CPT | Mod: GP

## 2024-03-13 ASSESSMENT — PAIN - FUNCTIONAL ASSESSMENT: PAIN_FUNCTIONAL_ASSESSMENT: 0-10

## 2024-03-13 ASSESSMENT — PAIN SCALES - GENERAL: PAINLEVEL_OUTOF10: 0 - NO PAIN

## 2024-03-13 NOTE — PROGRESS NOTES
Physical Therapy Treatment/Progress Report    Patient Name: Enrique Chun  MRN: [id number]  PT Received On: 03/13/24  Time Calculation  Start Time: 0745  Stop Time: 0840  Time Calculation (min): 55 min  PT Modalities Time Entry  E-Stim (Unattended) Time Entry: 10  Mechanical Traction Time Entry: 15  PT Therapeutic Procedures Time Entry  Therapeutic Exercise Time Entry: 8  Visit Number: 8 of 16  Visits/Dates Authorized: NO AUTH / [place] VISITS     Current Problem:   1. Acute strain of neck muscle, initial encounter  Follow Up In Physical Therapy      2. Cervical radiculopathy  Follow Up In Physical Therapy      3. Left arm pain  Follow Up In Physical Therapy        Precautions:     none    Subjective   General:   General Comment: Approx 2 wks since last appt. Was doing well initially, had an exacerbation of acid reflux, almost went to ED. Had to sleep sitting up. Since then pain in L posterior upper arm and into forearm worsened. Had to initiate gabapentin, yielding great relief but concerned to only be masking symptoms, when late on a dose yesterday, pain increased. F/U with PCP 3/18/24.    Pre-Treatment Symptoms:   Pain Assessment: 0-10  Pain Score: 0 - No pain    Objective   Findings:    strength L hand increased from average of 82# to 93#  Tip/Two-point pinch L increased from average of 10# to 10.5#  No significant change palmar/3-point pinch    Cervical ROM:  L lateral flexion increased from 22 to 30 degrees, had squeezing/pain L UT area  R lateral flexion increased from 28 to 35 degrees, had pulling L UT  L rotation increased from 50 to 53, without previous report of pressure  R rotation 70, unchanged    Bias for FW/depressed shoulder girdles  Pain response L pronator, AIN/PIN/deep radial n, L lower cervical vertebral column    Treatments:   Therapeutic Exercise:   Issued orange (medium) thera-putty and instructed pinch exercises  Thera-bar green: bend, twist, squeeze, etc for L hand strength  Wall  slide/shrug  Wrist flexor stretches at counter and with manual overpressure    Deferred:  Pulleys 2 min  UBE L2 reverse 4 min  TB row blue 2x15  TB horizontal abduction orange 2x10  TB B ER orange 2x10  Standing open book x10 R/L  Wall angels x10   Hand- Putty pinch and pull   Hand- webbing extension     Modalities:  Mechanical traction: Static: 31 lbs, for 15 minutes; in Supine with wedge, with L UE propped    Dry needling following informed consent: with e-stim; 100Hz, mA to tolerance, applied to L CPS to dorsal scapular n, L CPS to L triceps, L CPS to deep radial n, for 10 minutes. Additional DN to L spinal accessory n, L axillary n emergent point, L deep radial n, L pronation teres, PIN, (deferred AIN, superficial radial n)    Assessment:   PT Assessment  Assessment Comment: Pt with exacerbation of symptoms, pt attributes to GERD exacerbation, likely altered cervical positioning sitting up to sleep. Radicular symptoms currently managed by gabapentin. May benefit from pain management consult and home cervical traction.    Post-Treatment Symptoms:   Response to Interventions: some pain with ROM testing, still painful to palpation L forearm    Plan:    Pt to f/u with PCP re exacerbation. May benefit from up to 8 more (16 total) PT visits to achieve prior level of relief, due to isolated nature of exacerbation.    Goals:   Active       PT Problem       PT Goals 1-5 (Progressing)       Start:  01/23/24    Expected End:  04/25/24       1) Indep with HEP and progression.  2) L pinch strength within 10% of R  3)  small objects without difficulty  4) Sleep ad wale without disruption from pain  5) Symmetrical cervical ROM without eliciting symptoms.         Patient Stated Goal: reduce pain (Progressing)       Start:  01/23/24    Expected End:  04/25/24

## 2024-03-18 ENCOUNTER — OFFICE VISIT (OUTPATIENT)
Dept: PRIMARY CARE | Facility: CLINIC | Age: 54
End: 2024-03-18
Payer: COMMERCIAL

## 2024-03-18 VITALS
HEIGHT: 72 IN | DIASTOLIC BLOOD PRESSURE: 64 MMHG | SYSTOLIC BLOOD PRESSURE: 132 MMHG | WEIGHT: 267.8 LBS | OXYGEN SATURATION: 97 % | BODY MASS INDEX: 36.27 KG/M2 | RESPIRATION RATE: 16 BRPM | HEART RATE: 57 BPM

## 2024-03-18 DIAGNOSIS — E78.2 MIXED HYPERLIPIDEMIA: ICD-10-CM

## 2024-03-18 DIAGNOSIS — S16.1XXA ACUTE STRAIN OF NECK MUSCLE, INITIAL ENCOUNTER: Primary | ICD-10-CM

## 2024-03-18 PROCEDURE — 99214 OFFICE O/P EST MOD 30 MIN: CPT | Performed by: STUDENT IN AN ORGANIZED HEALTH CARE EDUCATION/TRAINING PROGRAM

## 2024-03-18 PROCEDURE — 3075F SYST BP GE 130 - 139MM HG: CPT | Performed by: STUDENT IN AN ORGANIZED HEALTH CARE EDUCATION/TRAINING PROGRAM

## 2024-03-18 PROCEDURE — 1036F TOBACCO NON-USER: CPT | Performed by: STUDENT IN AN ORGANIZED HEALTH CARE EDUCATION/TRAINING PROGRAM

## 2024-03-18 PROCEDURE — 3078F DIAST BP <80 MM HG: CPT | Performed by: STUDENT IN AN ORGANIZED HEALTH CARE EDUCATION/TRAINING PROGRAM

## 2024-03-18 RX ORDER — PRAVASTATIN SODIUM 40 MG/1
40 TABLET ORAL DAILY
Qty: 90 TABLET | Refills: 1 | Status: SHIPPED | OUTPATIENT
Start: 2024-03-18

## 2024-03-18 ASSESSMENT — PATIENT HEALTH QUESTIONNAIRE - PHQ9
SUM OF ALL RESPONSES TO PHQ9 QUESTIONS 1 AND 2: 0
1. LITTLE INTEREST OR PLEASURE IN DOING THINGS: NOT AT ALL
2. FEELING DOWN, DEPRESSED OR HOPELESS: NOT AT ALL

## 2024-03-18 NOTE — PROGRESS NOTES
Subjective   Patient ID: Enrique Chun is a 53 y.o. male who presents for Neck Pain (Pt is here for a re evaluation of his neck for a possible MRI.).    Patient presents to the office today for follow-up to his cervical strain. Patient states he initially had much success with physical therapy however a flare of acid reflux on 2/28/2024 had his neck strain concern worsening.  He returned to physical therapy.  He restarted his gabapentin 3 times daily and Flexeril.  Currently his pain is a dull 1 out of 10 with full range of motion being intact.        With the up titration to mounjaro, increase in gas and lack of appetite suppressant          Objective   Physical Exam  Vitals reviewed.   Constitutional:       Appearance: Normal appearance.   Cardiovascular:      Rate and Rhythm: Normal rate and regular rhythm.      Heart sounds: No murmur heard.  Pulmonary:      Effort: Pulmonary effort is normal. No respiratory distress.      Breath sounds: Normal breath sounds. No wheezing.   Musculoskeletal:      Cervical back: Neck supple.      Left lower leg: No edema.   Neurological:      Mental Status: He is alert.         Assessment/Plan   Diagnoses and all orders for this visit:  Acute strain of neck muscle, initial encounter  Comments:  improving symptoms but frequent flares  PT continuing   MRI cervical spine ordered  Orders:  -     MR cervical spine wo IV contrast; Future  Mixed hyperlipidemia  -     pravastatin (Pravachol) 40 mg tablet; Take 1 tablet (40 mg) by mouth once daily.           Jazmin Conrad DO 03/18/24 3:42 PM

## 2024-03-28 ENCOUNTER — HOSPITAL ENCOUNTER (OUTPATIENT)
Dept: RADIOLOGY | Facility: HOSPITAL | Age: 54
Discharge: HOME | End: 2024-03-28
Payer: COMMERCIAL

## 2024-03-28 DIAGNOSIS — S16.1XXA ACUTE STRAIN OF NECK MUSCLE, INITIAL ENCOUNTER: ICD-10-CM

## 2024-03-28 PROCEDURE — 72141 MRI NECK SPINE W/O DYE: CPT

## 2024-03-28 PROCEDURE — 72141 MRI NECK SPINE W/O DYE: CPT | Performed by: RADIOLOGY

## 2024-04-10 NOTE — PROGRESS NOTES
HPI   52 yo with Diabetes 2 (dx in his 40's), Dyslipidemia, fatty liver (seen on june 2022 scan), alec presents for followup. Last A1c-5.7%, today 5.8%.     Pt is testing sugars <1 times per day. Pt is having low sugars 0 times/week. 's on waking, not testing other times. Pt is doing better following a carb controlled diet and knows reasonable carb allowances. Pt is able to afford their medications. Pt is active at work.           Pt taking  mounjaro 15mg weekly (increased last visit), anjel is an option for fatty liver in the future.  -came off metformin at last visit    -wt high as 330lbs, today 267lbs (increased since last visit)          Taking pravastatin 40mg for lipids.       Current Outpatient Medications:     blood sugar diagnostic strip, Inject under the skin once daily. As directed, Disp: , Rfl:     cetirizine (Children's ZyrTEC Allergy) 10 mg tablet,disintegrating, Take 1 tablet by mouth once daily., Disp: , Rfl:     cyanocobalamin (Vitamin B-12) 1,000 mcg tablet, Take 1 tablet (1,000 mcg) by mouth once daily., Disp: , Rfl:     gabapentin (Neurontin) 300 mg capsule, Take 1 capsule (300 mg) by mouth 3 times a day. (Patient not taking: Reported on 3/18/2024), Disp: 90 capsule, Rfl: 5    lansoprazole (Prevacid) 30 mg DR capsule, Take 1 capsule (30 mg) by mouth once daily. Before a meal, Disp: 90 capsule, Rfl: 3    pravastatin (Pravachol) 40 mg tablet, Take 1 tablet (40 mg) by mouth once daily., Disp: 90 tablet, Rfl: 1    tirzepatide 15 mg/0.5 mL pen injector, Inject 15 mg under the skin every 7 days., Disp: 6 mL, Rfl: 3      Allergies as of 04/12/2024    (No Known Allergies)         Review of Systems   Cardiology: Lightheadedness-denies.  Chest pain-denies.  Leg edema-denies.  Palpitations-denies.  Respiratory: Cough-denies. Shortness of breath-denies.  Wheezing-denies.  Gastroenterology: Constipation-at times.  Diarrhea-denies.  Heartburn-on occ.  Endocrinology: Cold intolerance-denies.  Heat  intolerance-denies.  Sweats-denies.  Neurology: Headache-denies.  Tremor-denies.  Neuropathy in extremities-denies.  Psychology: Low energy-denies.  Irritability-denies.  Sleep disturbances-denies.      /77 (BP Location: Left arm, Patient Position: Sitting)   Pulse 60   Wt 121 kg (267 lb 12.8 oz)   BMI 36.32 kg/m²       Labs:  Lab Results   Component Value Date    WBC 9.6 06/22/2022    NRBC 0 02/24/2021    RBC 4.92 06/22/2022    HGB 14.2 06/22/2022    HCT 43.1 06/22/2022     06/22/2022     Lab Results   Component Value Date    CALCIUM 9.3 10/17/2023    AST 14 10/17/2023    ALKPHOS 62 10/17/2023    BILITOT 0.4 10/17/2023    PROT 7.0 10/17/2023    ALBUMIN 4.2 10/17/2023    GLOB 3.4 10/25/2021    AGR 1.2 (L) 10/25/2021     10/17/2023    K 4.4 10/17/2023     10/17/2023    CO2 29 10/17/2023    ANIONGAP 10 10/17/2023    BUN 13 10/17/2023    CREATININE 0.89 10/17/2023    UREACREAUR 12.5 10/25/2021    GLUCOSE 96 10/17/2023    ALT 15 10/17/2023    EGFR >90 10/17/2023     Lab Results   Component Value Date    CHOL 111 10/17/2023    TRIG 85 10/17/2023    HDL 37.1 10/17/2023    LDLCALC 57 10/17/2023     Lab Results   Component Value Date    MICROALBCREA 303.0 (H) 07/06/2022     Lab Results   Component Value Date    TSH 1.75 07/06/2022     Lab Results   Component Value Date    CJZUOJWD89 270 10/17/2023     Lab Results   Component Value Date    HGBA1C 5.8 04/12/2024         Physical Exam   General Appearance: pleasant, cooperative, no acute distress  HEENT: no chemosis, no proptosis, no lid lag, no lid retraction  Neck: no lymphadenopathy, no thyromegaly, no dominant thyroid nodules  Heart: no murmurs, regular rate and rhythm, S1 and S2  Lungs: no wheezes, no rhonci, no rales  Extremities: no lower extremity swelling      Assessment/Plan   1. Type 2 diabetes mellitus with other specified complication, unspecified whether long term insulin use (CMS/[facility])  -A1c ordered and reviewed  -glycemic log  reviewed  -labs reviewed    -test 3X/week at different times of day, daily if sugars hit the 150 range  -taking b12 supplement, currently off metformin since last visit    2. Fatty liver  -wt loss/mounjaro  -anjel is an option if needed, labs reviewed    3. Mixed hyperlipidemia  -on statin and tolerating, labs reviewed, at target ldl<70         Follow Up:  Rose Marie 6 months    -labs/tests/notes reviewed  -reviewed and counseled patient on medication monitoring and side effects

## 2024-04-12 ENCOUNTER — OFFICE VISIT (OUTPATIENT)
Dept: ENDOCRINOLOGY | Facility: CLINIC | Age: 54
End: 2024-04-12
Payer: COMMERCIAL

## 2024-04-12 VITALS
HEART RATE: 60 BPM | SYSTOLIC BLOOD PRESSURE: 120 MMHG | WEIGHT: 267.8 LBS | BODY MASS INDEX: 36.32 KG/M2 | DIASTOLIC BLOOD PRESSURE: 77 MMHG

## 2024-04-12 DIAGNOSIS — K76.0 FATTY LIVER DISEASE, NONALCOHOLIC: ICD-10-CM

## 2024-04-12 DIAGNOSIS — E11.69 TYPE 2 DIABETES MELLITUS WITH OTHER SPECIFIED COMPLICATION, UNSPECIFIED WHETHER LONG TERM INSULIN USE (MULTI): Primary | ICD-10-CM

## 2024-04-12 DIAGNOSIS — E78.2 MIXED HYPERLIPIDEMIA: ICD-10-CM

## 2024-04-12 LAB — POC HEMOGLOBIN A1C: 5.8 % (ref 4.2–6.5)

## 2024-04-12 PROCEDURE — 3074F SYST BP LT 130 MM HG: CPT | Performed by: INTERNAL MEDICINE

## 2024-04-12 PROCEDURE — 83036 HEMOGLOBIN GLYCOSYLATED A1C: CPT | Performed by: INTERNAL MEDICINE

## 2024-04-12 PROCEDURE — 1036F TOBACCO NON-USER: CPT | Performed by: INTERNAL MEDICINE

## 2024-04-12 PROCEDURE — 3078F DIAST BP <80 MM HG: CPT | Performed by: INTERNAL MEDICINE

## 2024-04-12 PROCEDURE — 99214 OFFICE O/P EST MOD 30 MIN: CPT | Performed by: INTERNAL MEDICINE

## 2024-04-12 ASSESSMENT — ENCOUNTER SYMPTOMS: DEPRESSION: 0

## 2024-04-12 ASSESSMENT — PAIN SCALES - GENERAL: PAINLEVEL: 0-NO PAIN

## 2024-07-15 ENCOUNTER — APPOINTMENT (OUTPATIENT)
Dept: PRIMARY CARE | Facility: CLINIC | Age: 54
End: 2024-07-15
Payer: COMMERCIAL

## 2024-07-15 VITALS
HEART RATE: 58 BPM | OXYGEN SATURATION: 97 % | WEIGHT: 265.2 LBS | HEIGHT: 72 IN | BODY MASS INDEX: 35.92 KG/M2 | SYSTOLIC BLOOD PRESSURE: 120 MMHG | DIASTOLIC BLOOD PRESSURE: 62 MMHG

## 2024-07-15 DIAGNOSIS — I10 ESSENTIAL (PRIMARY) HYPERTENSION: Primary | ICD-10-CM

## 2024-07-15 DIAGNOSIS — E11.69 TYPE 2 DIABETES MELLITUS WITH OTHER SPECIFIED COMPLICATION, UNSPECIFIED WHETHER LONG TERM INSULIN USE (MULTI): ICD-10-CM

## 2024-07-15 DIAGNOSIS — Z12.5 PROSTATE CANCER SCREENING: ICD-10-CM

## 2024-07-15 DIAGNOSIS — E78.2 MIXED HYPERLIPIDEMIA: ICD-10-CM

## 2024-07-15 PROCEDURE — 99214 OFFICE O/P EST MOD 30 MIN: CPT | Performed by: STUDENT IN AN ORGANIZED HEALTH CARE EDUCATION/TRAINING PROGRAM

## 2024-07-15 PROCEDURE — 3048F LDL-C <100 MG/DL: CPT | Performed by: STUDENT IN AN ORGANIZED HEALTH CARE EDUCATION/TRAINING PROGRAM

## 2024-07-15 PROCEDURE — 3074F SYST BP LT 130 MM HG: CPT | Performed by: STUDENT IN AN ORGANIZED HEALTH CARE EDUCATION/TRAINING PROGRAM

## 2024-07-15 PROCEDURE — 3008F BODY MASS INDEX DOCD: CPT | Performed by: STUDENT IN AN ORGANIZED HEALTH CARE EDUCATION/TRAINING PROGRAM

## 2024-07-15 PROCEDURE — 3061F NEG MICROALBUMINURIA REV: CPT | Performed by: STUDENT IN AN ORGANIZED HEALTH CARE EDUCATION/TRAINING PROGRAM

## 2024-07-15 PROCEDURE — 3078F DIAST BP <80 MM HG: CPT | Performed by: STUDENT IN AN ORGANIZED HEALTH CARE EDUCATION/TRAINING PROGRAM

## 2024-07-15 ASSESSMENT — PATIENT HEALTH QUESTIONNAIRE - PHQ9
2. FEELING DOWN, DEPRESSED OR HOPELESS: NOT AT ALL
1. LITTLE INTEREST OR PLEASURE IN DOING THINGS: NOT AT ALL
SUM OF ALL RESPONSES TO PHQ9 QUESTIONS 1 AND 2: 0

## 2024-07-15 NOTE — PROGRESS NOTES
History Of Present Illness  Enrique Chun is a 53 y.o. male presents to the office for annual cpe.      DM  Continued to have constipation: colon cleanse, daily laxative, Benafiber  Continued to have bloating and gas  Sulfur belching has   Appetite has returned     Noticed more easily bruising    Past Medical History  He has a past medical history of Calculus of kidney (06/29/2022), Diabetes mellitus with hyperglycemia (Multi), Essential (primary) hypertension, Essential hypertension, Fatty liver disease, nonalcoholic, Mixed hyperlipidemia, Personal history of other diseases of the digestive system, Personal history of other specified conditions, Type 2 diabetes mellitus with hyperglycemia, without long-term current use of insulin (Multi), and Type 2 diabetes mellitus with other specified complication, unspecified whether long term insulin use (Multi).    Surgical History  He has a past surgical history that includes Other surgical history (06/17/2022) and Other surgical history (06/24/2022).     Social History  He reports that he has never smoked. He has never been exposed to tobacco smoke. He has never used smokeless tobacco. He reports current alcohol use. He reports that he does not currently use drugs.    Family History  Family History   Problem Relation Name Age of Onset    Colonic polyp Mother      Diabetes Mother      No Known Problems Sister      Other (hypoglycemia) Daughter      Asthma Daughter      Emphysema Maternal Grandmother      Colon cancer Maternal Grandfather          Allergies  Patient has no known allergies.       Physical Exam  Vitals reviewed.   Constitutional:       General: He is not in acute distress.     Appearance: He is not ill-appearing.   HENT:      Right Ear: Tympanic membrane and ear canal normal.      Left Ear: Tympanic membrane and ear canal normal.      Mouth/Throat:      Mouth: Mucous membranes are moist.      Pharynx: Oropharynx is clear. No oropharyngeal exudate or  posterior oropharyngeal erythema.   Eyes:      Extraocular Movements: Extraocular movements intact.      Conjunctiva/sclera: Conjunctivae normal.      Pupils: Pupils are equal, round, and reactive to light.   Neck:      Vascular: No carotid bruit.   Cardiovascular:      Rate and Rhythm: Normal rate and regular rhythm.      Heart sounds: No murmur heard.     No gallop.   Pulmonary:      Effort: Pulmonary effort is normal.      Breath sounds: Normal breath sounds. No wheezing, rhonchi or rales.   Abdominal:      General: Abdomen is flat. Bowel sounds are normal.      Palpations: Abdomen is soft.      Tenderness: There is no abdominal tenderness.   Musculoskeletal:      Cervical back: Neck supple.      Left lower leg: No edema.   Skin:     General: Skin is warm and dry.      Findings: No rash.   Neurological:      General: No focal deficit present.      Mental Status: He is alert and oriented to person, place, and time.      Gait: Gait normal.   Psychiatric:         Mood and Affect: Mood normal.         Behavior: Behavior normal.        Last Recorded Vitals  /62   Pulse 58   Wt 120 kg (265 lb 3.2 oz)   SpO2 97%     Relevant Results    Current Outpatient Medications:     blood sugar diagnostic strip, Inject under the skin once daily. As directed, Disp: , Rfl:     cetirizine (Children's ZyrTEC Allergy) 10 mg tablet,disintegrating, Take 1 tablet by mouth once daily., Disp: , Rfl:     cyanocobalamin (Vitamin B-12) 1,000 mcg tablet, Take 1 tablet (1,000 mcg) by mouth once daily., Disp: , Rfl:     lansoprazole (Prevacid) 30 mg DR capsule, Take 1 capsule (30 mg) by mouth once daily. Before a meal, Disp: 90 capsule, Rfl: 3    pravastatin (Pravachol) 40 mg tablet, Take 1 tablet (40 mg) by mouth once daily., Disp: 90 tablet, Rfl: 1    tirzepatide 15 mg/0.5 mL pen injector, Inject 15 mg under the skin every 7 days., Disp: 6 mL, Rfl: 3          Assessment/Plan   Diagnoses and all orders for this visit:  Essential  (primary) hypertension  -     CBC and Auto Differential; Future  -     Comprehensive metabolic panel; Future  -     Lipid Panel; Future  -     Tsh With Reflex To Free T4 If Abnormal; Future  -     Albumin-Creatinine Ratio, Urine Random; Future  Type 2 diabetes mellitus with other specified complication, unspecified whether long term insulin use (Multi)  -     CBC and Auto Differential; Future  -     Comprehensive metabolic panel; Future  -     Lipid Panel; Future  -     Tsh With Reflex To Free T4 If Abnormal; Future  Mixed hyperlipidemia  -     Lipid Panel; Future  Prostate cancer screening  -     Prostate Spec.Ag,Screen; Future    HTN  120/62mmHg  Physical exam was stable. Discussed maintaining diets such as DASH to help maintain normal Blood pressure. Encouraged regular exercise for a total of 120 min weekly. We will fu labs in 1-3 days. For now there will be no change in medical management. All questions and concerns were addressed. Pt will follow up in 4-6 months or sooner if needed.     DM  A1C 5.8% 3 months ago   Very well controlled  Diet controlled    Prostate Cancer screening  PSA ordered    DSL  Lipid panel ordered    Health Maintenance   Topic Date Due    Yearly Adult Physical  Never done    HIV Screening  Never done    MMR Vaccines (1 of 1 - Standard series) Never done    Pneumococcal Vaccine: Pediatrics (0 to 5 Years) and At-Risk Patients (6 to 64 Years) (1 of 2 - PCV) Never done    Hepatitis C Screening  Never done    Hepatitis B Vaccines (1 of 3 - 19+ 3-dose series) Never done    Zoster Vaccines (1 of 2) 12/19/2019    COVID-19 Vaccine (4 - 2023-24 season) 09/01/2023    Diabetes: Retinopathy Screening  09/24/2023    Diabetes: Hemoglobin A1C  07/12/2024    Influenza Vaccine (1) 09/01/2024    Diabetes: Foot Exam  04/02/2025    Lipid Panel  07/17/2025    Diabetes: Urine Protein Screening  07/17/2025    Colorectal Cancer Screening  10/04/2029    DTaP/Tdap/Td Vaccines (3 - Td or Tdap) 06/17/2032     Hepatitis A Vaccines  Completed    HIB Vaccines  Aged Out    IPV Vaccines  Aged Out    Meningococcal Vaccine  Aged Out    Rotavirus Vaccines  Aged Out    HPV Vaccines  Aged Out    Irritable Bowel Syndrome  Discontinued                Jazmin Conrad DO

## 2024-07-17 ENCOUNTER — LAB (OUTPATIENT)
Dept: LAB | Facility: LAB | Age: 54
End: 2024-07-17
Payer: COMMERCIAL

## 2024-07-17 DIAGNOSIS — E11.69 TYPE 2 DIABETES MELLITUS WITH OTHER SPECIFIED COMPLICATION, UNSPECIFIED WHETHER LONG TERM INSULIN USE (MULTI): ICD-10-CM

## 2024-07-17 DIAGNOSIS — E78.2 MIXED HYPERLIPIDEMIA: ICD-10-CM

## 2024-07-17 DIAGNOSIS — Z12.5 PROSTATE CANCER SCREENING: ICD-10-CM

## 2024-07-17 DIAGNOSIS — I10 ESSENTIAL (PRIMARY) HYPERTENSION: ICD-10-CM

## 2024-07-17 LAB
ALBUMIN SERPL BCP-MCNC: 4.1 G/DL (ref 3.4–5)
ALP SERPL-CCNC: 66 U/L (ref 33–120)
ALT SERPL W P-5'-P-CCNC: 22 U/L (ref 10–52)
ANION GAP SERPL CALC-SCNC: 11 MMOL/L (ref 10–20)
AST SERPL W P-5'-P-CCNC: 16 U/L (ref 9–39)
BASOPHILS # BLD AUTO: 0.03 X10*3/UL (ref 0–0.1)
BASOPHILS NFR BLD AUTO: 0.4 %
BILIRUB SERPL-MCNC: 0.5 MG/DL (ref 0–1.2)
BUN SERPL-MCNC: 15 MG/DL (ref 6–23)
CALCIUM SERPL-MCNC: 9 MG/DL (ref 8.6–10.3)
CHLORIDE SERPL-SCNC: 102 MMOL/L (ref 98–107)
CHOLEST SERPL-MCNC: 108 MG/DL (ref 0–199)
CHOLESTEROL/HDL RATIO: 3.5
CO2 SERPL-SCNC: 27 MMOL/L (ref 21–32)
CREAT SERPL-MCNC: 0.81 MG/DL (ref 0.5–1.3)
CREAT UR-MCNC: 166.4 MG/DL (ref 20–370)
EGFRCR SERPLBLD CKD-EPI 2021: >90 ML/MIN/1.73M*2
EOSINOPHIL # BLD AUTO: 0.08 X10*3/UL (ref 0–0.7)
EOSINOPHIL NFR BLD AUTO: 1 %
ERYTHROCYTE [DISTWIDTH] IN BLOOD BY AUTOMATED COUNT: 13 % (ref 11.5–14.5)
GLUCOSE SERPL-MCNC: 90 MG/DL (ref 74–99)
HCT VFR BLD AUTO: 47.1 % (ref 41–52)
HDLC SERPL-MCNC: 30.7 MG/DL
HGB BLD-MCNC: 15.6 G/DL (ref 13.5–17.5)
IMM GRANULOCYTES # BLD AUTO: 0.04 X10*3/UL (ref 0–0.7)
IMM GRANULOCYTES NFR BLD AUTO: 0.5 % (ref 0–0.9)
LDLC SERPL CALC-MCNC: 57 MG/DL
LYMPHOCYTES # BLD AUTO: 1.73 X10*3/UL (ref 1.2–4.8)
LYMPHOCYTES NFR BLD AUTO: 21.4 %
MCH RBC QN AUTO: 29.3 PG (ref 26–34)
MCHC RBC AUTO-ENTMCNC: 33.1 G/DL (ref 32–36)
MCV RBC AUTO: 88 FL (ref 80–100)
MICROALBUMIN UR-MCNC: 7.6 MG/L
MICROALBUMIN/CREAT UR: 4.6 UG/MG CREAT
MONOCYTES # BLD AUTO: 0.52 X10*3/UL (ref 0.1–1)
MONOCYTES NFR BLD AUTO: 6.4 %
NEUTROPHILS # BLD AUTO: 5.67 X10*3/UL (ref 1.2–7.7)
NEUTROPHILS NFR BLD AUTO: 70.3 %
NON HDL CHOLESTEROL: 77 MG/DL (ref 0–149)
NRBC BLD-RTO: 0 /100 WBCS (ref 0–0)
PLATELET # BLD AUTO: 240 X10*3/UL (ref 150–450)
POTASSIUM SERPL-SCNC: 4.3 MMOL/L (ref 3.5–5.3)
PROT SERPL-MCNC: 6.8 G/DL (ref 6.4–8.2)
PSA SERPL-MCNC: 1.89 NG/ML
RBC # BLD AUTO: 5.33 X10*6/UL (ref 4.5–5.9)
SODIUM SERPL-SCNC: 136 MMOL/L (ref 136–145)
TRIGL SERPL-MCNC: 102 MG/DL (ref 0–149)
TSH SERPL-ACNC: 2.07 MIU/L (ref 0.44–3.98)
VLDL: 20 MG/DL (ref 0–40)
WBC # BLD AUTO: 8.1 X10*3/UL (ref 4.4–11.3)

## 2024-07-17 PROCEDURE — 84443 ASSAY THYROID STIM HORMONE: CPT

## 2024-07-17 PROCEDURE — 82570 ASSAY OF URINE CREATININE: CPT

## 2024-07-17 PROCEDURE — 85025 COMPLETE CBC W/AUTO DIFF WBC: CPT

## 2024-07-17 PROCEDURE — 84153 ASSAY OF PSA TOTAL: CPT

## 2024-07-17 PROCEDURE — 80053 COMPREHEN METABOLIC PANEL: CPT

## 2024-07-17 PROCEDURE — 82043 UR ALBUMIN QUANTITATIVE: CPT

## 2024-07-17 PROCEDURE — 36415 COLL VENOUS BLD VENIPUNCTURE: CPT

## 2024-07-17 PROCEDURE — 80061 LIPID PANEL: CPT

## 2024-08-05 ENCOUNTER — TELEPHONE (OUTPATIENT)
Dept: PRIMARY CARE | Facility: CLINIC | Age: 54
End: 2024-08-05
Payer: COMMERCIAL

## 2024-09-01 DIAGNOSIS — E78.2 MIXED HYPERLIPIDEMIA: ICD-10-CM

## 2024-09-03 RX ORDER — PRAVASTATIN SODIUM 40 MG/1
40 TABLET ORAL DAILY
Qty: 90 TABLET | Refills: 1 | Status: SHIPPED | OUTPATIENT
Start: 2024-09-03

## 2024-09-11 ENCOUNTER — TELEPHONE (OUTPATIENT)
Dept: PRIMARY CARE | Facility: CLINIC | Age: 54
End: 2024-09-11
Payer: COMMERCIAL

## 2024-09-11 NOTE — TELEPHONE ENCOUNTER
Patient called in to schedule appointment, states has had blood in urine last few weeks, denies any other sx. offered sooner with other provider, patient wanted to see you. Got him scheduled for first available next Wednesday 9/18. Did you want to see him sooner?

## 2024-09-16 ENCOUNTER — APPOINTMENT (OUTPATIENT)
Dept: PRIMARY CARE | Facility: CLINIC | Age: 54
End: 2024-09-16
Payer: COMMERCIAL

## 2024-09-18 ENCOUNTER — HOSPITAL ENCOUNTER (OUTPATIENT)
Dept: RADIOLOGY | Facility: HOSPITAL | Age: 54
Discharge: HOME | End: 2024-09-18
Payer: COMMERCIAL

## 2024-09-18 ENCOUNTER — APPOINTMENT (OUTPATIENT)
Dept: PRIMARY CARE | Facility: CLINIC | Age: 54
End: 2024-09-18
Payer: COMMERCIAL

## 2024-09-18 VITALS
OXYGEN SATURATION: 96 % | HEART RATE: 69 BPM | BODY MASS INDEX: 35.11 KG/M2 | WEIGHT: 259.2 LBS | DIASTOLIC BLOOD PRESSURE: 72 MMHG | HEIGHT: 72 IN | SYSTOLIC BLOOD PRESSURE: 112 MMHG

## 2024-09-18 DIAGNOSIS — M54.9 COSTOVERTEBRAL ANGLE TENDERNESS: ICD-10-CM

## 2024-09-18 DIAGNOSIS — R31.9 HEMATURIA, UNSPECIFIED TYPE: ICD-10-CM

## 2024-09-18 DIAGNOSIS — Z87.442 HISTORY OF NEPHROLITHIASIS: ICD-10-CM

## 2024-09-18 DIAGNOSIS — I10 ESSENTIAL (PRIMARY) HYPERTENSION: Primary | ICD-10-CM

## 2024-09-18 LAB
POC APPEARANCE, URINE: CLEAR
POC BILIRUBIN, URINE: NEGATIVE
POC BLOOD, URINE: ABNORMAL
POC COLOR, URINE: ABNORMAL
POC GLUCOSE, URINE: NEGATIVE MG/DL
POC KETONES, URINE: NEGATIVE MG/DL
POC LEUKOCYTES, URINE: NEGATIVE
POC NITRITE,URINE: NEGATIVE
POC PH, URINE: 6.5 PH
POC PROTEIN, URINE: NEGATIVE MG/DL
POC SPECIFIC GRAVITY, URINE: >=1.03
POC UROBILINOGEN, URINE: 0.2 EU/DL

## 2024-09-18 PROCEDURE — 3074F SYST BP LT 130 MM HG: CPT | Performed by: STUDENT IN AN ORGANIZED HEALTH CARE EDUCATION/TRAINING PROGRAM

## 2024-09-18 PROCEDURE — 3078F DIAST BP <80 MM HG: CPT | Performed by: STUDENT IN AN ORGANIZED HEALTH CARE EDUCATION/TRAINING PROGRAM

## 2024-09-18 PROCEDURE — 81003 URINALYSIS AUTO W/O SCOPE: CPT | Performed by: STUDENT IN AN ORGANIZED HEALTH CARE EDUCATION/TRAINING PROGRAM

## 2024-09-18 PROCEDURE — 74176 CT ABD & PELVIS W/O CONTRAST: CPT

## 2024-09-18 PROCEDURE — 3008F BODY MASS INDEX DOCD: CPT | Performed by: STUDENT IN AN ORGANIZED HEALTH CARE EDUCATION/TRAINING PROGRAM

## 2024-09-18 PROCEDURE — 1036F TOBACCO NON-USER: CPT | Performed by: STUDENT IN AN ORGANIZED HEALTH CARE EDUCATION/TRAINING PROGRAM

## 2024-09-18 PROCEDURE — 99214 OFFICE O/P EST MOD 30 MIN: CPT | Performed by: STUDENT IN AN ORGANIZED HEALTH CARE EDUCATION/TRAINING PROGRAM

## 2024-09-18 PROCEDURE — 87086 URINE CULTURE/COLONY COUNT: CPT

## 2024-09-18 PROCEDURE — 3048F LDL-C <100 MG/DL: CPT | Performed by: STUDENT IN AN ORGANIZED HEALTH CARE EDUCATION/TRAINING PROGRAM

## 2024-09-18 PROCEDURE — 3061F NEG MICROALBUMINURIA REV: CPT | Performed by: STUDENT IN AN ORGANIZED HEALTH CARE EDUCATION/TRAINING PROGRAM

## 2024-09-18 PROCEDURE — 74176 CT ABD & PELVIS W/O CONTRAST: CPT | Performed by: RADIOLOGY

## 2024-09-18 ASSESSMENT — ENCOUNTER SYMPTOMS
HEMATURIA: 1
ABDOMINAL PAIN: 0
FEVER: 0
FREQUENCY: 0
FLANK PAIN: 1
DYSURIA: 0
NAUSEA: 0

## 2024-09-18 ASSESSMENT — LIFESTYLE VARIABLES: TOBACCO_USE: 0

## 2024-09-18 NOTE — PROGRESS NOTES
Subjective   Patient ID: Enrique Chun is a 53 y.o. male who presents for Blood in Urine (PT IS HERE BLOOD IN URINE FOR THE LAST 6 WKS. IT IS VISIBLE AT TIMES AND THEN GOES AWAY AND COME BACK.).    Did have some left cva back pain with some visual blood a couple of months ago.   Restarted a couple of weeks ago, with cva tenderness precipitated by hematuria       Blood in Urine  This is a recurrent problem. The current episode started 1 to 4 weeks ago. The problem has been gradually improving since onset. He describes the hematuria as gross hematuria. Pain scale: discomfort ache. Irritative symptoms do not include frequency, nocturia or urgency. Associated symptoms include flank pain. Pertinent negatives include no abdominal pain, bladder pain, dysuria, fever, genital pain, hesitancy, inability to urinate or nausea. His past medical history is significant for hypertension and kidney stones. There is no history of BPH or tobacco use.         Objective   Physical Exam  Vitals reviewed.   Constitutional:       Appearance: Normal appearance.   Cardiovascular:      Rate and Rhythm: Normal rate and regular rhythm.      Heart sounds: No murmur heard.  Pulmonary:      Effort: Pulmonary effort is normal. No respiratory distress.      Breath sounds: Normal breath sounds. No wheezing.   Abdominal:      General: Abdomen is flat.      Palpations: Abdomen is soft.      Tenderness: There is left CVA tenderness.   Musculoskeletal:      Cervical back: Neck supple.      Left lower leg: No edema.   Neurological:      Mental Status: He is alert.           Current Outpatient Medications:     blood sugar diagnostic strip, Inject under the skin once daily. As directed, Disp: , Rfl:     cetirizine (Children's ZyrTEC Allergy) 10 mg tablet,disintegrating, Take 1 tablet by mouth once daily., Disp: , Rfl:     cyanocobalamin (Vitamin B-12) 1,000 mcg tablet, Take 1 tablet (1,000 mcg) by mouth once daily., Disp: , Rfl:     pravastatin  (Pravachol) 40 mg tablet, TAKE 1 TABLET ONCE DAILY, Disp: 90 tablet, Rfl: 1    tirzepatide 15 mg/0.5 mL pen injector, Inject 15 mg under the skin every 7 days., Disp: 6 mL, Rfl: 3    lansoprazole (Prevacid) 30 mg DR capsule, Take 1 capsule (30 mg) by mouth once daily. Before a meal, Disp: 90 capsule, Rfl: 3    Assessment/Plan   Diagnoses and all orders for this visit:  Essential (primary) hypertension  Hematuria, unspecified type  -     POCT UA Automated manually resulted  -     Urine Culture  -     CT abdomen pelvis wo IV contrast; Future  Costovertebral angle tenderness  -     CT abdomen pelvis wo IV contrast; Future  History of nephrolithiasis  -     CT abdomen pelvis wo IV contrast; Future    Hematuria  UA + intact blood   Hx of tobacco: never   PSA-1.89  HX of nephrolithiasis: yes with lithotripsy   Followed previously with urology        Jazmin Conrad DO 09/18/24 9:52 AM

## 2024-09-19 LAB — BACTERIA UR CULT: NO GROWTH

## 2024-09-24 DIAGNOSIS — I10 ESSENTIAL (PRIMARY) HYPERTENSION: ICD-10-CM

## 2024-09-24 RX ORDER — LANSOPRAZOLE 30 MG/1
CAPSULE, DELAYED RELEASE ORAL
Qty: 90 CAPSULE | Refills: 1 | Status: SHIPPED | OUTPATIENT
Start: 2024-09-24

## 2024-10-18 NOTE — PROGRESS NOTES
HPI   52 yo with Diabetes 2 (dx in his 40's), Dyslipidemia, fatty liver (seen on june 2022 scan), alec, hx of kidney stones presents for followup. Last A1c-5.8%, today 5.6%.      Pt is testing sugars <1 times per day. Pt is having low sugars 0 times/week. 's at different times of day. Pt is doing better following a carb controlled diet and knows reasonable carb allowances (eats 1 meal/day). Pt is able to afford their medications. Pt is active at work.           Pt taking  mounjaro 15mg weekly, anjel is an option for fatty liver in the future.  -came off metformin at last visit     -wt high as 330lbs, today 262 lbs           Taking pravastatin 40mg for lipids.    -recently with blood in urine, seeing urology had recent CT scan    Current Outpatient Medications:     blood sugar diagnostic strip, Inject under the skin once daily. As directed, Disp: , Rfl:     cetirizine (Children's ZyrTEC Allergy) 10 mg tablet,disintegrating, Take 1 tablet by mouth once daily., Disp: , Rfl:     cyanocobalamin (Vitamin B-12) 1,000 mcg tablet, Take 1 tablet (1,000 mcg) by mouth once daily., Disp: , Rfl:     lansoprazole (Prevacid) 30 mg DR capsule, TAKE 1 CAPSULE ONCE DAILY  BEFORE A MEAL, Disp: 90 capsule, Rfl: 1    Mounjaro 15 mg/0.5 mL pen injector, Inject 15 mg under the skin 1 (one) time per week., Disp: , Rfl:     pravastatin (Pravachol) 40 mg tablet, TAKE 1 TABLET ONCE DAILY, Disp: 90 tablet, Rfl: 1      Allergies as of 10/21/2024    (No Known Allergies)         Review of Systems   Cardiology: Lightheadedness-denies.  Chest pain-denies.  Leg edema-denies.  Palpitations-denies.  Respiratory: Cough-denies. Shortness of breath-denies.  Wheezing-denies.  Gastroenterology: Constipation +.  Diarrhea-denies.  Heartburn-on meds  Endocrinology: Cold intolerance-denies.  Heat intolerance-denies.  Sweats-denies.  Neurology: Headache-denies.  Tremor-denies.  Neuropathy in extremities-denies.  Psychology: Low energy-denies.   "Irritability-denies.  Sleep disturbances-denies.      /70 (BP Location: Left arm, Patient Position: Sitting)   Pulse 63   Ht 1.956 m (6' 5\")   Wt 119 kg (262 lb 12.8 oz)   BMI 31.16 kg/m²       Labs:  Lab Results   Component Value Date    WBC 8.1 07/17/2024    NRBC 0.0 07/17/2024    RBC 5.33 07/17/2024    HGB 15.6 07/17/2024    HCT 47.1 07/17/2024     07/17/2024     Lab Results   Component Value Date    CALCIUM 9.0 07/17/2024    AST 16 07/17/2024    ALKPHOS 66 07/17/2024    BILITOT 0.5 07/17/2024    PROT 6.8 07/17/2024    ALBUMIN 4.1 07/17/2024    GLOB 3.4 10/25/2021    AGR 1.2 (L) 10/25/2021     07/17/2024    K 4.3 07/17/2024     07/17/2024    CO2 27 07/17/2024    ANIONGAP 11 07/17/2024    BUN 15 07/17/2024    CREATININE 0.81 07/17/2024    UREACREAUR 12.5 10/25/2021    GLUCOSE 90 07/17/2024    ALT 22 07/17/2024    EGFR >90 07/17/2024     Lab Results   Component Value Date    CHOL 108 07/17/2024    TRIG 102 07/17/2024    HDL 30.7 07/17/2024    LDLCALC 57 07/17/2024     Lab Results   Component Value Date    MICROALBCREA 4.6 07/17/2024     Lab Results   Component Value Date    TSH 2.07 07/17/2024     Lab Results   Component Value Date    AIIIJSWG42 270 10/17/2023     Lab Results   Component Value Date    HGBA1C 5.6 10/21/2024         Physical Exam   General Appearance: pleasant, cooperative, no acute distress  HEENT: no chemosis, no proptosis, no lid lag, no lid retraction  Neck: no lymphadenopathy, no thyromegaly, no dominant thyroid nodules  Heart: no murmurs, regular rate and rhythm, S1 and S2  Lungs: no wheezes, no rhonci, no rales  Extremities: no lower extremity swelling      Assessment/Plan   1. Type 2 diabetes mellitus with other specified complication, unspecified whether long term insulin use (Multi) (Primary)  -A1c ordered and reviewed  -labs reviewed  -glycemic log reviewed    -overall doing well, meds/lifestyle  -hold mounjaro 1 week prior to conolonoscpy    2. Mixed " hyperlipidemia  -on statin at ldl target <70, labs reviewed, no change in therapy    3. Fatty liver disease, nonalcoholic  -wt loss, glp-1, LFT's have improved will monitor           Follow Up:  Rose Marie 6 months    -labs/tests/notes reviewed  -reviewed and counseled patient on medication monitoring and side effects

## 2024-10-21 ENCOUNTER — APPOINTMENT (OUTPATIENT)
Dept: ENDOCRINOLOGY | Facility: CLINIC | Age: 54
End: 2024-10-21
Payer: COMMERCIAL

## 2024-10-21 VITALS
HEIGHT: 77 IN | BODY MASS INDEX: 31.03 KG/M2 | DIASTOLIC BLOOD PRESSURE: 70 MMHG | SYSTOLIC BLOOD PRESSURE: 117 MMHG | HEART RATE: 63 BPM | WEIGHT: 262.8 LBS

## 2024-10-21 DIAGNOSIS — K76.0 FATTY LIVER DISEASE, NONALCOHOLIC: ICD-10-CM

## 2024-10-21 DIAGNOSIS — E11.69 TYPE 2 DIABETES MELLITUS WITH OTHER SPECIFIED COMPLICATION, UNSPECIFIED WHETHER LONG TERM INSULIN USE (MULTI): Primary | ICD-10-CM

## 2024-10-21 DIAGNOSIS — E78.2 MIXED HYPERLIPIDEMIA: ICD-10-CM

## 2024-10-21 LAB — POC HEMOGLOBIN A1C: 5.6 % (ref 4.2–6.5)

## 2024-10-21 PROCEDURE — 3048F LDL-C <100 MG/DL: CPT | Performed by: INTERNAL MEDICINE

## 2024-10-21 PROCEDURE — 83036 HEMOGLOBIN GLYCOSYLATED A1C: CPT | Performed by: INTERNAL MEDICINE

## 2024-10-21 PROCEDURE — 3008F BODY MASS INDEX DOCD: CPT | Performed by: INTERNAL MEDICINE

## 2024-10-21 PROCEDURE — 3074F SYST BP LT 130 MM HG: CPT | Performed by: INTERNAL MEDICINE

## 2024-10-21 PROCEDURE — 1036F TOBACCO NON-USER: CPT | Performed by: INTERNAL MEDICINE

## 2024-10-21 PROCEDURE — 3078F DIAST BP <80 MM HG: CPT | Performed by: INTERNAL MEDICINE

## 2024-10-21 PROCEDURE — 99214 OFFICE O/P EST MOD 30 MIN: CPT | Performed by: INTERNAL MEDICINE

## 2024-10-21 PROCEDURE — 3061F NEG MICROALBUMINURIA REV: CPT | Performed by: INTERNAL MEDICINE

## 2024-10-21 RX ORDER — TIRZEPATIDE 15 MG/.5ML
15 INJECTION, SOLUTION SUBCUTANEOUS WEEKLY
COMMUNITY
Start: 2024-10-07

## 2024-10-21 ASSESSMENT — ENCOUNTER SYMPTOMS
DEPRESSION: 0
LOSS OF SENSATION IN FEET: 0
OCCASIONAL FEELINGS OF UNSTEADINESS: 0

## 2024-10-21 ASSESSMENT — PAIN SCALES - GENERAL: PAINLEVEL_OUTOF10: 0-NO PAIN

## 2024-10-21 ASSESSMENT — PATIENT HEALTH QUESTIONNAIRE - PHQ9
1. LITTLE INTEREST OR PLEASURE IN DOING THINGS: NOT AT ALL
SUM OF ALL RESPONSES TO PHQ9 QUESTIONS 1 & 2: 0
2. FEELING DOWN, DEPRESSED OR HOPELESS: NOT AT ALL

## 2024-11-05 ENCOUNTER — APPOINTMENT (OUTPATIENT)
Dept: UROLOGY | Facility: CLINIC | Age: 54
End: 2024-11-05
Payer: COMMERCIAL

## 2024-11-05 DIAGNOSIS — Z87.442 PERSONAL HISTORY OF KIDNEY STONES: ICD-10-CM

## 2024-11-05 DIAGNOSIS — R31.0 GROSS HEMATURIA: Primary | ICD-10-CM

## 2024-11-05 DIAGNOSIS — Z12.5 ENCOUNTER FOR SCREENING FOR MALIGNANT NEOPLASM OF PROSTATE: ICD-10-CM

## 2024-11-05 PROCEDURE — 99204 OFFICE O/P NEW MOD 45 MIN: CPT | Performed by: SURGERY

## 2024-11-05 PROCEDURE — 1036F TOBACCO NON-USER: CPT | Performed by: SURGERY

## 2024-11-05 PROCEDURE — 88112 CYTOPATH CELL ENHANCE TECH: CPT

## 2024-11-05 PROCEDURE — 3061F NEG MICROALBUMINURIA REV: CPT | Performed by: SURGERY

## 2024-11-05 PROCEDURE — 3048F LDL-C <100 MG/DL: CPT | Performed by: SURGERY

## 2024-11-05 PROCEDURE — 88112 CYTOPATH CELL ENHANCE TECH: CPT | Performed by: PATHOLOGY

## 2024-11-05 ASSESSMENT — PAIN SCALES - GENERAL: PAINLEVEL_OUTOF10: 1

## 2024-11-05 NOTE — PROGRESS NOTES
Subjective   Patient ID: Enrique Chun is a 53 y.o. male who presents for Blood in Urine.    HPI  He is known from my previous practice.  He has had several episodes of gross hematuria over the past few months.  He has a prior history of kidney stones.  His last lithotripsy was about 3 years ago.  He did notice several clots during his hematuria episodes.  He has no real issues with voiding.  He has had weight loss, which he attributes to his new diabetes medication.  He is not a smoker.  He has no occupational exposure to carcinogens.      Review of Systems  As per HPI, remaining 10 systems negative        Objective   Physical Exam  Well nourished  Abdomen soft, ND, NT, mildly obese  No hernias  Circumcised, patent meatus, no lesions  Bilateral descended testes, no masses              Assessment/Plan   Problem List Items Addressed This Visit    None  Visit Diagnoses         Codes    Gross hematuria    -  Primary R31.0    Relevant Orders    Cytology Consultation (Non-Gynecologic)    Cystoscopy    Encounter for screening for malignant neoplasm of prostate     Z12.5    Personal history of kidney stones     Z87.442               Hematuria.  Given his episodes he likely needs further investigation.  I will send his urine for cytology today.  I will schedule him for an office cystoscopy.    History of renal stones.  He had a CT scan done on September 18.  There is no evidence of any renal masses, stones, or hydronephrosis.  No other pathology was seen.    Prostate cancer screening.  His PSA is 1.8, this is normal.        Lui Davis MD 11/05/24 2:20 PM

## 2024-11-07 LAB
LABORATORY COMMENT REPORT: NORMAL
LABORATORY COMMENT REPORT: NORMAL
PATH REPORT.FINAL DX SPEC: NORMAL
PATH REPORT.GROSS SPEC: NORMAL
PATH REPORT.RELEVANT HX SPEC: NORMAL
PATH REPORT.TOTAL CANCER: NORMAL

## 2024-11-12 ENCOUNTER — APPOINTMENT (OUTPATIENT)
Dept: UROLOGY | Facility: CLINIC | Age: 54
End: 2024-11-12
Payer: COMMERCIAL

## 2024-11-12 VITALS
WEIGHT: 262 LBS | SYSTOLIC BLOOD PRESSURE: 111 MMHG | HEART RATE: 58 BPM | HEIGHT: 77 IN | DIASTOLIC BLOOD PRESSURE: 73 MMHG | TEMPERATURE: 98 F | BODY MASS INDEX: 30.94 KG/M2

## 2024-11-12 DIAGNOSIS — R31.0 GROSS HEMATURIA: Primary | ICD-10-CM

## 2024-11-12 DIAGNOSIS — N20.0 KIDNEY STONES: ICD-10-CM

## 2024-11-12 LAB
POC APPEARANCE, URINE: CLEAR
POC BILIRUBIN, URINE: NEGATIVE
POC BLOOD, URINE: ABNORMAL
POC COLOR, URINE: YELLOW
POC GLUCOSE, URINE: NEGATIVE MG/DL
POC KETONES, URINE: NEGATIVE MG/DL
POC LEUKOCYTES, URINE: NEGATIVE
POC NITRITE,URINE: NEGATIVE
POC PH, URINE: 6 PH
POC PROTEIN, URINE: NEGATIVE MG/DL
POC SPECIFIC GRAVITY, URINE: 1.02
POC UROBILINOGEN, URINE: 0.2 EU/DL

## 2024-11-12 PROCEDURE — 88112 CYTOPATH CELL ENHANCE TECH: CPT

## 2024-11-12 PROCEDURE — 81003 URINALYSIS AUTO W/O SCOPE: CPT | Performed by: SURGERY

## 2024-11-12 PROCEDURE — 52000 CYSTOURETHROSCOPY: CPT | Performed by: SURGERY

## 2024-11-12 PROCEDURE — 88112 CYTOPATH CELL ENHANCE TECH: CPT | Performed by: PATHOLOGY

## 2024-11-12 ASSESSMENT — PAIN SCALES - GENERAL: PAINLEVEL_OUTOF10: 5

## 2024-11-12 NOTE — PROGRESS NOTES
Patient ID: Enrique Chun is a 53 y.o. male.  Here for hematuria evaluation.   Has prior history of stone disease.          Cystoscopy    Date/Time: 11/12/2024 1:36 PM    Performed by: Lui Davis MD  Authorized by: Lui Davis MD    Procedure - Bladder Cystoscopy:     Procedure details: cystoscopy    Post-procedure:     Patient tolerance: Patient tolerated the procedure well with no immediate complications      Comments:      The patient was placed in a supine position.  His genitalia were prepped and draped.  We introduced viscous lidocaine per urethra.  A cystoscope was passed per urethra, and we entered the bladder.  The orifices were identified and appeared normal with clear efflux.  The bladder mucosa was inspected.  No tumors or stones seen.  Prostate is enlarged, mildly obstructive.  No strictures seen.        Plan: Return in 6 months with a KUB.  Send urine for cytology.

## 2025-01-13 DIAGNOSIS — E11.69 TYPE 2 DIABETES MELLITUS WITH OTHER SPECIFIED COMPLICATION, UNSPECIFIED WHETHER LONG TERM INSULIN USE (MULTI): Primary | ICD-10-CM

## 2025-01-13 RX ORDER — TIRZEPATIDE 15 MG/.5ML
15 INJECTION, SOLUTION SUBCUTANEOUS WEEKLY
Qty: 6 ML | Refills: 1 | Status: SHIPPED | OUTPATIENT
Start: 2025-01-13 | End: 2025-01-15 | Stop reason: SDUPTHER

## 2025-01-15 ENCOUNTER — APPOINTMENT (OUTPATIENT)
Dept: PRIMARY CARE | Facility: CLINIC | Age: 55
End: 2025-01-15
Payer: COMMERCIAL

## 2025-01-15 DIAGNOSIS — E11.69 TYPE 2 DIABETES MELLITUS WITH OTHER SPECIFIED COMPLICATION, UNSPECIFIED WHETHER LONG TERM INSULIN USE (MULTI): ICD-10-CM

## 2025-01-15 RX ORDER — TIRZEPATIDE 15 MG/.5ML
15 INJECTION, SOLUTION SUBCUTANEOUS WEEKLY
Qty: 6 ML | Refills: 1 | Status: SHIPPED | OUTPATIENT
Start: 2025-01-15

## 2025-01-15 NOTE — TELEPHONE ENCOUNTER
We are sending your prescription to [facility] for benefits investigation and affordability support.      If you have any questions or would like to check the status of your prescription(s),  please contact the pharmacy directly at [phone].

## 2025-01-16 PROCEDURE — RXMED WILLOW AMBULATORY MEDICATION CHARGE

## 2025-01-24 ENCOUNTER — PHARMACY VISIT (OUTPATIENT)
Dept: PHARMACY | Facility: CLINIC | Age: 55
End: 2025-01-24
Payer: MEDICARE

## 2025-02-05 ENCOUNTER — DOCUMENTATION (OUTPATIENT)
Dept: PHYSICAL THERAPY | Facility: CLINIC | Age: 55
End: 2025-02-05
Payer: COMMERCIAL

## 2025-02-05 NOTE — PROGRESS NOTES
Physical Therapy     Discharge Summary     Name: Enrique Chun  MRN: [id number]  Date: 1/6/2025     Discharge Information:   Date of discharge 2/5/25  Date of last visit 3/13/24  Date of evaluation 1/23/24  Number of attended visits 8     Referred for cervical radiculopathy, L arm pain, neck strain     Therapy Summary: Pt had demonstrated progress until sleeping sitting up due to acid reflux and then had exacerbation of shoulder/arm pain. Pt was to f/u with PCP. Did not return.     Discharge Reason(s): Did not schedule additional appointments

## 2025-02-18 ENCOUNTER — TELEPHONE (OUTPATIENT)
Dept: ENDOCRINOLOGY | Facility: CLINIC | Age: 55
End: 2025-02-18
Payer: COMMERCIAL

## 2025-02-20 ENCOUNTER — TELEPHONE (OUTPATIENT)
Dept: ENDOCRINOLOGY | Facility: CLINIC | Age: 55
End: 2025-02-20
Payer: COMMERCIAL

## 2025-02-28 DIAGNOSIS — E78.2 MIXED HYPERLIPIDEMIA: ICD-10-CM

## 2025-02-28 RX ORDER — PRAVASTATIN SODIUM 40 MG/1
40 TABLET ORAL DAILY
Qty: 90 TABLET | Refills: 1 | Status: SHIPPED | OUTPATIENT
Start: 2025-02-28

## 2025-03-23 DIAGNOSIS — I10 ESSENTIAL (PRIMARY) HYPERTENSION: ICD-10-CM

## 2025-03-24 RX ORDER — LANSOPRAZOLE 30 MG/1
CAPSULE, DELAYED RELEASE ORAL
Qty: 90 CAPSULE | Refills: 1 | Status: SHIPPED | OUTPATIENT
Start: 2025-03-24

## 2025-04-18 PROCEDURE — RXMED WILLOW AMBULATORY MEDICATION CHARGE

## 2025-04-21 ENCOUNTER — APPOINTMENT (OUTPATIENT)
Dept: ENDOCRINOLOGY | Facility: CLINIC | Age: 55
End: 2025-04-21
Payer: COMMERCIAL

## 2025-04-29 ENCOUNTER — PHARMACY VISIT (OUTPATIENT)
Dept: PHARMACY | Facility: CLINIC | Age: 55
End: 2025-04-29
Payer: MEDICARE

## 2025-05-13 ENCOUNTER — APPOINTMENT (OUTPATIENT)
Dept: UROLOGY | Facility: CLINIC | Age: 55
End: 2025-05-13
Payer: COMMERCIAL

## 2025-06-13 ENCOUNTER — APPOINTMENT (OUTPATIENT)
Dept: ENDOCRINOLOGY | Facility: CLINIC | Age: 55
End: 2025-06-13
Payer: COMMERCIAL

## 2025-06-30 ENCOUNTER — HOSPITAL ENCOUNTER (OUTPATIENT)
Dept: RADIOLOGY | Facility: HOSPITAL | Age: 55
Discharge: HOME | End: 2025-06-30
Payer: COMMERCIAL

## 2025-06-30 ENCOUNTER — APPOINTMENT (OUTPATIENT)
Dept: PRIMARY CARE | Facility: CLINIC | Age: 55
End: 2025-06-30
Payer: COMMERCIAL

## 2025-06-30 VITALS
WEIGHT: 267.2 LBS | HEART RATE: 55 BPM | BODY MASS INDEX: 36.19 KG/M2 | SYSTOLIC BLOOD PRESSURE: 112 MMHG | HEIGHT: 72 IN | DIASTOLIC BLOOD PRESSURE: 62 MMHG | OXYGEN SATURATION: 98 %

## 2025-06-30 DIAGNOSIS — M25.512 ACUTE PAIN OF LEFT SHOULDER: Primary | ICD-10-CM

## 2025-06-30 DIAGNOSIS — M25.512 ACUTE PAIN OF LEFT SHOULDER: ICD-10-CM

## 2025-06-30 PROCEDURE — 3008F BODY MASS INDEX DOCD: CPT | Performed by: STUDENT IN AN ORGANIZED HEALTH CARE EDUCATION/TRAINING PROGRAM

## 2025-06-30 PROCEDURE — 73030 X-RAY EXAM OF SHOULDER: CPT | Mod: LT

## 2025-06-30 PROCEDURE — 99214 OFFICE O/P EST MOD 30 MIN: CPT | Performed by: STUDENT IN AN ORGANIZED HEALTH CARE EDUCATION/TRAINING PROGRAM

## 2025-06-30 PROCEDURE — 3074F SYST BP LT 130 MM HG: CPT | Performed by: STUDENT IN AN ORGANIZED HEALTH CARE EDUCATION/TRAINING PROGRAM

## 2025-06-30 PROCEDURE — 1036F TOBACCO NON-USER: CPT | Performed by: STUDENT IN AN ORGANIZED HEALTH CARE EDUCATION/TRAINING PROGRAM

## 2025-06-30 PROCEDURE — 73030 X-RAY EXAM OF SHOULDER: CPT | Mod: LEFT SIDE | Performed by: RADIOLOGY

## 2025-06-30 PROCEDURE — 3078F DIAST BP <80 MM HG: CPT | Performed by: STUDENT IN AN ORGANIZED HEALTH CARE EDUCATION/TRAINING PROGRAM

## 2025-06-30 RX ORDER — CYCLOBENZAPRINE HCL 10 MG
10 TABLET ORAL NIGHTLY PRN
Qty: 30 TABLET | Refills: 0 | Status: SHIPPED | OUTPATIENT
Start: 2025-06-30 | End: 2025-08-29

## 2025-06-30 ASSESSMENT — PATIENT HEALTH QUESTIONNAIRE - PHQ9
1. LITTLE INTEREST OR PLEASURE IN DOING THINGS: NOT AT ALL
2. FEELING DOWN, DEPRESSED OR HOPELESS: NOT AT ALL
SUM OF ALL RESPONSES TO PHQ9 QUESTIONS 1 AND 2: 0

## 2025-06-30 NOTE — PROGRESS NOTES
Subjective   Patient ID:   Enrique Chun is a  54 y.o. male who presents for Shoulder Injury (Pt is here for left shoulder pain from a fall about 1-1.5 months ago.).     Shoulder Injury   The incident occurred at home (fell 4-6 weeks ago while doing yard work). The left shoulder is affected. The injury mechanism was a fall. The quality of the pain is described as aching (dull, worse at night). The pain does not radiate. The pain is moderate. Exacerbated by: laying on it. He has tried NSAIDs and ice for the symptoms.       Previous left shoulder injury last year which improved with PT     Current Medications[1]    Visit Vitals  /62   Pulse 55   Ht 1.829 m (6')   Wt 121 kg (267 lb 3.2 oz)   SpO2 98%   BMI 36.24 kg/m²   Smoking Status Never   BSA 2.48 m²       Objective   Physical Exam  Vitals reviewed.   Constitutional:       Appearance: Normal appearance.   Cardiovascular:      Rate and Rhythm: Normal rate and regular rhythm.      Heart sounds: No murmur heard.  Pulmonary:      Effort: Pulmonary effort is normal. No respiratory distress.      Breath sounds: Normal breath sounds. No wheezing.   Musculoskeletal:      Cervical back: Neck supple.      Left lower leg: No edema.      Comments: Full active ROM  No pain illicit on palpation   No warmth noted   Neurological:      Mental Status: He is alert.           Assessment/Plan   Diagnoses and all orders for this visit:  Acute pain of left shoulder  -     XR shoulder left 2+ views; Future  -     cyclobenzaprine (Flexeril) 10 mg tablet; Take 1 tablet (10 mg) by mouth as needed at bedtime for muscle spasms.    Left Shoulder Pain   Full active ROM intact   No pain illicit on palpation, mainly symptomatic at night time   Likely SITS injury  Xray ordered   Flexeril for pain at night time   Injury was 4 weeks ago   Pt to report his symptoms in 2 weeks for improvement   Consider MRI at that time.          Jazmin Conrad DO 03/12/25 2:02 PM        [1]   Current  Outpatient Medications:     blood sugar diagnostic strip, Inject under the skin once daily. As directed, Disp: , Rfl:     cetirizine (Children's ZyrTEC Allergy) 10 mg tablet,disintegrating, Take 1 tablet by mouth once daily., Disp: , Rfl:     cyanocobalamin (Vitamin B-12) 1,000 mcg tablet, Take 1 tablet (1,000 mcg) by mouth once daily., Disp: , Rfl:     lansoprazole (Prevacid) 30 mg DR capsule, TAKE 1 CAPSULE ONCE DAILY  BEFORE A MEAL, Disp: 90 capsule, Rfl: 1    Mounjaro 15 mg/0.5 mL pen injector, Inject 15 mg under the skin 1 (one) time per week., Disp: 6 mL, Rfl: 1    pravastatin (Pravachol) 40 mg tablet, TAKE 1 TABLET ONCE DAILY, Disp: 90 tablet, Rfl: 1

## 2025-07-15 DIAGNOSIS — E11.69 TYPE 2 DIABETES MELLITUS WITH OTHER SPECIFIED COMPLICATION, UNSPECIFIED WHETHER LONG TERM INSULIN USE (MULTI): ICD-10-CM

## 2025-07-15 RX ORDER — TIRZEPATIDE 15 MG/.5ML
15 INJECTION, SOLUTION SUBCUTANEOUS WEEKLY
Qty: 6 ML | Refills: 1 | Status: SHIPPED | OUTPATIENT
Start: 2025-07-15

## 2025-07-27 DIAGNOSIS — M25.512 ACUTE PAIN OF LEFT SHOULDER: ICD-10-CM

## 2025-07-28 PROCEDURE — RXMED WILLOW AMBULATORY MEDICATION CHARGE

## 2025-07-28 RX ORDER — CYCLOBENZAPRINE HCL 10 MG
10 TABLET ORAL NIGHTLY PRN
Qty: 30 TABLET | Refills: 0 | Status: SHIPPED | OUTPATIENT
Start: 2025-07-28 | End: 2025-09-26

## 2025-07-29 ENCOUNTER — PHARMACY VISIT (OUTPATIENT)
Dept: PHARMACY | Facility: CLINIC | Age: 55
End: 2025-07-29
Payer: MEDICARE

## 2025-08-11 ENCOUNTER — TELEPHONE (OUTPATIENT)
Facility: CLINIC | Age: 55
End: 2025-08-11
Payer: COMMERCIAL

## 2025-08-12 ENCOUNTER — TELEPHONE (OUTPATIENT)
Facility: CLINIC | Age: 55
End: 2025-08-12
Payer: COMMERCIAL

## 2025-08-13 ENCOUNTER — APPOINTMENT (OUTPATIENT)
Facility: CLINIC | Age: 55
End: 2025-08-13
Payer: COMMERCIAL

## 2025-08-13 VITALS
WEIGHT: 274 LBS | DIASTOLIC BLOOD PRESSURE: 77 MMHG | SYSTOLIC BLOOD PRESSURE: 116 MMHG | BODY MASS INDEX: 37.16 KG/M2 | HEART RATE: 57 BPM

## 2025-08-13 DIAGNOSIS — E78.2 MIXED HYPERLIPIDEMIA: ICD-10-CM

## 2025-08-13 DIAGNOSIS — E11.69 TYPE 2 DIABETES MELLITUS WITH OTHER SPECIFIED COMPLICATION, UNSPECIFIED WHETHER LONG TERM INSULIN USE (MULTI): Primary | ICD-10-CM

## 2025-08-13 DIAGNOSIS — I10 ESSENTIAL (PRIMARY) HYPERTENSION: ICD-10-CM

## 2025-08-13 DIAGNOSIS — K76.0 FATTY LIVER DISEASE, NONALCOHOLIC: ICD-10-CM

## 2025-08-13 LAB — POC HEMOGLOBIN A1C: 5.6 % (ref 4.2–6.5)

## 2025-08-13 PROCEDURE — 3078F DIAST BP <80 MM HG: CPT | Performed by: INTERNAL MEDICINE

## 2025-08-13 PROCEDURE — 99214 OFFICE O/P EST MOD 30 MIN: CPT | Performed by: INTERNAL MEDICINE

## 2025-08-13 PROCEDURE — 3074F SYST BP LT 130 MM HG: CPT | Performed by: INTERNAL MEDICINE

## 2025-08-13 PROCEDURE — 1036F TOBACCO NON-USER: CPT | Performed by: INTERNAL MEDICINE

## 2025-08-13 PROCEDURE — 3044F HG A1C LEVEL LT 7.0%: CPT | Performed by: INTERNAL MEDICINE

## 2025-08-13 PROCEDURE — 83036 HEMOGLOBIN GLYCOSYLATED A1C: CPT | Performed by: INTERNAL MEDICINE

## 2025-08-13 RX ORDER — TIRZEPATIDE 15 MG/.5ML
15 INJECTION, SOLUTION SUBCUTANEOUS WEEKLY
Qty: 6 ML | Refills: 1 | Status: SHIPPED | OUTPATIENT
Start: 2025-08-13

## 2025-08-13 ASSESSMENT — PATIENT HEALTH QUESTIONNAIRE - PHQ9
SUM OF ALL RESPONSES TO PHQ9 QUESTIONS 1 & 2: 0
1. LITTLE INTEREST OR PLEASURE IN DOING THINGS: NOT AT ALL
2. FEELING DOWN, DEPRESSED OR HOPELESS: NOT AT ALL

## 2025-08-13 ASSESSMENT — PAIN SCALES - GENERAL: PAINLEVEL_OUTOF10: 0-NO PAIN

## 2025-08-20 DIAGNOSIS — E78.2 MIXED HYPERLIPIDEMIA: ICD-10-CM

## 2025-08-20 RX ORDER — PRAVASTATIN SODIUM 40 MG/1
40 TABLET ORAL DAILY
Qty: 90 TABLET | Refills: 1 | Status: SHIPPED | OUTPATIENT
Start: 2025-08-20

## 2026-02-24 ENCOUNTER — APPOINTMENT (OUTPATIENT)
Facility: CLINIC | Age: 56
End: 2026-02-24
Payer: COMMERCIAL